# Patient Record
Sex: MALE | Race: ASIAN | NOT HISPANIC OR LATINO | Employment: OTHER | URBAN - METROPOLITAN AREA
[De-identification: names, ages, dates, MRNs, and addresses within clinical notes are randomized per-mention and may not be internally consistent; named-entity substitution may affect disease eponyms.]

---

## 2023-09-06 DIAGNOSIS — E78.2 MIXED HYPERLIPIDEMIA: Primary | ICD-10-CM

## 2023-09-07 RX ORDER — ROSUVASTATIN CALCIUM 10 MG/1
10 TABLET, COATED ORAL DAILY
Qty: 90 TABLET | Refills: 3 | Status: SHIPPED | OUTPATIENT
Start: 2023-09-07

## 2023-10-01 PROBLEM — D33.3 ACOUSTIC NEUROMA (MULTI): Status: ACTIVE | Noted: 2023-10-01

## 2023-10-01 PROBLEM — E78.5 HYPERLIPIDEMIA: Status: ACTIVE | Noted: 2023-10-01

## 2023-10-01 PROBLEM — R55 SYNCOPE: Status: ACTIVE | Noted: 2023-10-01

## 2023-10-01 PROBLEM — R35.1 NOCTURIA: Status: ACTIVE | Noted: 2023-10-01

## 2023-10-01 PROBLEM — M24.40: Status: ACTIVE | Noted: 2023-10-01

## 2023-10-01 PROBLEM — H90.3 ASYMMETRICAL SENSORINEURAL HEARING LOSS: Status: ACTIVE | Noted: 2023-10-01

## 2023-10-01 PROBLEM — R97.20 ELEVATED PSA: Status: ACTIVE | Noted: 2023-10-01

## 2023-10-01 PROBLEM — M19.042 ARTHRITIS OF BOTH HANDS: Status: ACTIVE | Noted: 2023-10-01

## 2023-10-01 PROBLEM — S46.911A RIGHT SHOULDER STRAIN: Status: ACTIVE | Noted: 2023-10-01

## 2023-10-01 PROBLEM — I25.10 PRECLINICAL CORONARY ARTERY DISEASE: Status: ACTIVE | Noted: 2023-10-01

## 2023-10-01 PROBLEM — M24.411 RECURRENT DISLOCATION OF RIGHT SHOULDER: Status: ACTIVE | Noted: 2023-10-01

## 2023-10-01 PROBLEM — M54.10 BILATERAL RADIATING LEG PAIN: Status: ACTIVE | Noted: 2023-10-01

## 2023-10-01 PROBLEM — M19.041 ARTHRITIS OF BOTH HANDS: Status: ACTIVE | Noted: 2023-10-01

## 2023-10-01 PROBLEM — M25.311 INSTABILITY OF RIGHT SHOULDER JOINT: Status: ACTIVE | Noted: 2023-10-01

## 2023-10-01 PROBLEM — R73.03 PREDIABETES: Status: ACTIVE | Noted: 2023-10-01

## 2023-10-03 ENCOUNTER — OFFICE VISIT (OUTPATIENT)
Dept: ORTHOPEDIC SURGERY | Facility: CLINIC | Age: 73
End: 2023-10-03
Payer: MEDICARE

## 2023-10-03 VITALS — BODY MASS INDEX: 25.43 KG/M2 | WEIGHT: 162 LBS | HEIGHT: 67 IN

## 2023-10-03 DIAGNOSIS — S46.019S TRAUMATIC COMPLETE TEAR OF ROTATOR CUFF, UNSPECIFIED LATERALITY, SEQUELA: Primary | ICD-10-CM

## 2023-10-03 PROCEDURE — 1159F MED LIST DOCD IN RCRD: CPT | Performed by: ORTHOPAEDIC SURGERY

## 2023-10-03 PROCEDURE — 99214 OFFICE O/P EST MOD 30 MIN: CPT | Performed by: ORTHOPAEDIC SURGERY

## 2023-10-03 PROCEDURE — 1160F RVW MEDS BY RX/DR IN RCRD: CPT | Performed by: ORTHOPAEDIC SURGERY

## 2023-10-03 NOTE — LETTER
October 3, 2023     Moo Traore MD  [address]  [facility], [address]  [facility] [address]    Patient: Giacomo Garay   YOB: 1950   Date of Visit: 10/3/2023       Dear Dr. Moo Traore MD:    Thank you for referring Giacomo Garay to me for evaluation. Below are my notes for this consultation.  If you have questions, please do not hesitate to call me. I look forward to following your patient along with you.       Sincerely,     Ayad Ron MD      CC: No Recipients  ______________________________________________________________________________________    Follow-up to discuss his MRI findings  He has no pain in the shoulder states his larger concern is a repeat dislocation which happened a couple times the last several years  His MRI from 2019 shows no cuff tear  MRI is reviewed personally and shows a small retracted cuff tear with minimal muscular atrophy  Past medical,family and social histories have been reviewed and are up to date.  All other body systems have been reviewed and are negative for complaint.  Constitutional: Well-developed well-nourished   Eyes: Sclerae anicteric, pupils equal and round  HENT: Normocephalic atraumatic  Cardiovascular: Pulses full, regular rate and rhythm  Respiratory: Breathing not labored, no wheezing  Integumentary: Skin intact, no lesions or rashes  Neurological: Sensation intact, no gross strength deficits, reflexes equal  Psychiatric: Alert oriented and appropriate  Hematologic/lymphatic: No lymphadenopathy  Right shoulder it no swelling or deformity has full mobility has good cuff strength minor pain in the impingement position  Impression cuff tear underlying shoulder instability he states he knows what types of actions precipitated dislocation was only happened a couple of times he is having no pain so is not inclined to proceed with any type of surgical reconstruction we discussed the natural history of rotator cuff  disease tearing and problems with healing as we age  He is content to caters activity accordingly and revisit this if symptoms persist or worsen  All questions answered

## 2023-10-03 NOTE — PROGRESS NOTES
Follow-up to discuss his MRI findings  He has no pain in the shoulder states his larger concern is a repeat dislocation which happened a couple times the last several years  His MRI from 2019 shows no cuff tear  MRI is reviewed personally and shows a small retracted cuff tear with minimal muscular atrophy  Past medical,family and social histories have been reviewed and are up to date.  All other body systems have been reviewed and are negative for complaint.  Constitutional: Well-developed well-nourished   Eyes: Sclerae anicteric, pupils equal and round  HENT: Normocephalic atraumatic  Cardiovascular: Pulses full, regular rate and rhythm  Respiratory: Breathing not labored, no wheezing  Integumentary: Skin intact, no lesions or rashes  Neurological: Sensation intact, no gross strength deficits, reflexes equal  Psychiatric: Alert oriented and appropriate  Hematologic/lymphatic: No lymphadenopathy  Right shoulder it no swelling or deformity has full mobility has good cuff strength minor pain in the impingement position  Impression cuff tear underlying shoulder instability he states he knows what types of actions precipitated dislocation was only happened a couple of times he is having no pain so is not inclined to proceed with any type of surgical reconstruction we discussed the natural history of rotator cuff disease tearing and problems with healing as we age  He is content to caters activity accordingly and revisit this if symptoms persist or worsen  All questions answered

## 2024-06-17 ENCOUNTER — APPOINTMENT (OUTPATIENT)
Dept: PRIMARY CARE | Facility: CLINIC | Age: 74
End: 2024-06-17
Payer: MEDICARE

## 2024-06-17 VITALS
WEIGHT: 159 LBS | HEIGHT: 66 IN | HEART RATE: 75 BPM | DIASTOLIC BLOOD PRESSURE: 92 MMHG | SYSTOLIC BLOOD PRESSURE: 172 MMHG | TEMPERATURE: 98 F | BODY MASS INDEX: 25.55 KG/M2 | OXYGEN SATURATION: 99 %

## 2024-06-17 DIAGNOSIS — H61.22 IMPACTED CERUMEN OF LEFT EAR: ICD-10-CM

## 2024-06-17 DIAGNOSIS — Z12.5 SCREENING FOR PROSTATE CANCER: ICD-10-CM

## 2024-06-17 DIAGNOSIS — Z00.00 ROUTINE GENERAL MEDICAL EXAMINATION AT HEALTH CARE FACILITY: Primary | ICD-10-CM

## 2024-06-17 DIAGNOSIS — E78.2 MIXED HYPERLIPIDEMIA: ICD-10-CM

## 2024-06-17 PROCEDURE — 99214 OFFICE O/P EST MOD 30 MIN: CPT | Performed by: FAMILY MEDICINE

## 2024-06-17 PROCEDURE — 1159F MED LIST DOCD IN RCRD: CPT | Performed by: FAMILY MEDICINE

## 2024-06-17 PROCEDURE — 1170F FXNL STATUS ASSESSED: CPT | Performed by: FAMILY MEDICINE

## 2024-06-17 PROCEDURE — G0439 PPPS, SUBSEQ VISIT: HCPCS | Performed by: FAMILY MEDICINE

## 2024-06-17 PROCEDURE — 1160F RVW MEDS BY RX/DR IN RCRD: CPT | Performed by: FAMILY MEDICINE

## 2024-06-17 PROCEDURE — 1036F TOBACCO NON-USER: CPT | Performed by: FAMILY MEDICINE

## 2024-06-17 RX ORDER — ROSUVASTATIN CALCIUM 10 MG/1
10 TABLET, COATED ORAL DAILY
Qty: 90 TABLET | Refills: 3 | Status: SHIPPED | OUTPATIENT
Start: 2024-06-17

## 2024-06-17 ASSESSMENT — ACTIVITIES OF DAILY LIVING (ADL)
GROCERY_SHOPPING: NEEDS ASSISTANCE
MANAGING_FINANCES: INDEPENDENT
DOING_HOUSEWORK: INDEPENDENT
TAKING_MEDICATION: INDEPENDENT
DRESSING: INDEPENDENT
BATHING: INDEPENDENT

## 2024-06-17 ASSESSMENT — PATIENT HEALTH QUESTIONNAIRE - PHQ9
SUM OF ALL RESPONSES TO PHQ9 QUESTIONS 1 AND 2: 0
1. LITTLE INTEREST OR PLEASURE IN DOING THINGS: NOT AT ALL
2. FEELING DOWN, DEPRESSED OR HOPELESS: NOT AT ALL

## 2024-06-17 NOTE — PROGRESS NOTES
"Subjective   Reason for Visit: Giacomo Garay is an 73 y.o. male here for a Medicare Wellness visit.     Past Medical, Surgical, and Family History reviewed and updated in chart.    Reviewed all medications by prescribing practitioner or clinical pharmacist (such as prescriptions, OTCs, herbal therapies and supplements) and documented in the medical record.    HPI    Patient Care Team:  Evert Steward MD as PCP - General (Family Medicine)  Moo Traore MD as PCP - [employer] PCP     Review of Systems   All other systems reviewed and are negative.    Preparing meals - independent  Using telephone - independent  Bladder - continent  Bowel - continent    Recent hospital stays - none    ADLs - able to dress, walk and bath independently  Instrumental ADL's - able to shop, maintain finances, managing medications, housekeeping independently    Depression: PHQ-2 (screening 2 mins) - negative    Opioid use -   none  Exercise -  stays active  Diet - well balanced  Pain score - none    Providers    No cognitive impairment appreciated    Education - educated pt on healthy eating, exercise, weight loss    Falls - none    Counseled pt on living will    AAA screening:  NA     Prostate CA screen:  checking PSA    CV screening:  CA score was 27.6    Colonoscopy: done in 2019    Diabetes screening:  none    Glaucoma screen:  sees optho    CT chest tob screen: NA    Vaccines:  Talked to pt about seeing pharmacy about his pneumonia shots and        Objective   Vitals:  BP (!) 172/92   Pulse 75   Temp 36.7 °C (98 °F)   Ht 1.676 m (5' 6\")   Wt 72.1 kg (159 lb)   SpO2 99%   BMI 25.66 kg/m²       Physical Exam  Vitals reviewed.   Constitutional:       General: He is not in acute distress.     Appearance: Normal appearance. He is well-developed. He is not diaphoretic.   HENT:      Head: Normocephalic and atraumatic.      Right Ear: Tympanic membrane normal.      Left Ear: Tympanic membrane normal.      Nose: " Nose normal.      Mouth/Throat:      Mouth: Mucous membranes are moist.   Eyes:      Pupils: Pupils are equal, round, and reactive to light.   Cardiovascular:      Rate and Rhythm: Normal rate and regular rhythm.      Heart sounds: Normal heart sounds. No murmur heard.     No friction rub. No gallop.   Pulmonary:      Effort: Pulmonary effort is normal.      Breath sounds: Normal breath sounds. No rales.   Abdominal:      General: Bowel sounds are normal.      Palpations: Abdomen is soft.      Tenderness: There is no abdominal tenderness.   Musculoskeletal:      Cervical back: Normal range of motion and neck supple.   Skin:     General: Skin is warm and dry.   Neurological:      Mental Status: He is alert.   Psychiatric:         Mood and Affect: Mood normal.         Assessment/Plan   Problem List Items Addressed This Visit       Hyperlipidemia    Relevant Medications    rosuvastatin (Crestor) 10 mg tablet    Other Relevant Orders    Lipid Panel    Comprehensive Metabolic Panel    CBC and Auto Differential     Other Visit Diagnoses       Routine general medical examination at health care facility    -  Primary    Screening for prostate cancer        Relevant Orders    Prostate Specific Antigen    Impacted cerumen of left ear        Relevant Orders    Ear cerumen removal          Lavaged L ear.   Refilled pts meds.   Doing well.   Fu in 12 mo.  Curcumin for arthritis.

## 2024-07-11 ENCOUNTER — LAB (OUTPATIENT)
Dept: LAB | Facility: LAB | Age: 74
End: 2024-07-11
Payer: MEDICARE

## 2024-07-11 DIAGNOSIS — E78.2 MIXED HYPERLIPIDEMIA: ICD-10-CM

## 2024-07-11 DIAGNOSIS — Z12.5 SCREENING FOR PROSTATE CANCER: ICD-10-CM

## 2024-07-11 LAB
ALBUMIN SERPL BCP-MCNC: 4.5 G/DL (ref 3.4–5)
ALP SERPL-CCNC: 38 U/L (ref 33–136)
ALT SERPL W P-5'-P-CCNC: 22 U/L (ref 10–52)
ANION GAP SERPL CALC-SCNC: 11 MMOL/L (ref 10–20)
AST SERPL W P-5'-P-CCNC: 26 U/L (ref 9–39)
BASOPHILS # BLD AUTO: 0.03 X10*3/UL (ref 0–0.1)
BASOPHILS NFR BLD AUTO: 0.4 %
BILIRUB SERPL-MCNC: 1 MG/DL (ref 0–1.2)
BUN SERPL-MCNC: 21 MG/DL (ref 6–23)
CALCIUM SERPL-MCNC: 9.5 MG/DL (ref 8.6–10.3)
CHLORIDE SERPL-SCNC: 103 MMOL/L (ref 98–107)
CHOLEST SERPL-MCNC: 202 MG/DL (ref 0–199)
CHOLESTEROL/HDL RATIO: 3.2
CO2 SERPL-SCNC: 29 MMOL/L (ref 21–32)
CREAT SERPL-MCNC: 1.41 MG/DL (ref 0.5–1.3)
EGFRCR SERPLBLD CKD-EPI 2021: 53 ML/MIN/1.73M*2
EOSINOPHIL # BLD AUTO: 0.09 X10*3/UL (ref 0–0.4)
EOSINOPHIL NFR BLD AUTO: 1.1 %
ERYTHROCYTE [DISTWIDTH] IN BLOOD BY AUTOMATED COUNT: 12.6 % (ref 11.5–14.5)
GLUCOSE SERPL-MCNC: 85 MG/DL (ref 74–99)
HCT VFR BLD AUTO: 48.8 % (ref 41–52)
HDLC SERPL-MCNC: 62.2 MG/DL
HGB BLD-MCNC: 15.7 G/DL (ref 13.5–17.5)
IMM GRANULOCYTES # BLD AUTO: 0.02 X10*3/UL (ref 0–0.5)
IMM GRANULOCYTES NFR BLD AUTO: 0.3 % (ref 0–0.9)
LDLC SERPL CALC-MCNC: 121 MG/DL
LYMPHOCYTES # BLD AUTO: 2.19 X10*3/UL (ref 0.8–3)
LYMPHOCYTES NFR BLD AUTO: 27.7 %
MCH RBC QN AUTO: 28.3 PG (ref 26–34)
MCHC RBC AUTO-ENTMCNC: 32.2 G/DL (ref 32–36)
MCV RBC AUTO: 88 FL (ref 80–100)
MONOCYTES # BLD AUTO: 0.71 X10*3/UL (ref 0.05–0.8)
MONOCYTES NFR BLD AUTO: 9 %
NEUTROPHILS # BLD AUTO: 4.86 X10*3/UL (ref 1.6–5.5)
NEUTROPHILS NFR BLD AUTO: 61.5 %
NON HDL CHOLESTEROL: 140 MG/DL (ref 0–149)
NRBC BLD-RTO: 0.3 /100 WBCS (ref 0–0)
PLATELET # BLD AUTO: 235 X10*3/UL (ref 150–450)
POTASSIUM SERPL-SCNC: 4.7 MMOL/L (ref 3.5–5.3)
PROT SERPL-MCNC: 6.9 G/DL (ref 6.4–8.2)
PSA SERPL-MCNC: 9.55 NG/ML
RBC # BLD AUTO: 5.55 X10*6/UL (ref 4.5–5.9)
SODIUM SERPL-SCNC: 138 MMOL/L (ref 136–145)
TRIGL SERPL-MCNC: 92 MG/DL (ref 0–149)
VLDL: 18 MG/DL (ref 0–40)
WBC # BLD AUTO: 7.9 X10*3/UL (ref 4.4–11.3)

## 2024-07-11 PROCEDURE — 36415 COLL VENOUS BLD VENIPUNCTURE: CPT

## 2024-07-11 PROCEDURE — 85025 COMPLETE CBC W/AUTO DIFF WBC: CPT

## 2024-07-11 PROCEDURE — 80053 COMPREHEN METABOLIC PANEL: CPT

## 2024-07-11 PROCEDURE — 80061 LIPID PANEL: CPT

## 2024-07-11 PROCEDURE — G0103 PSA SCREENING: HCPCS

## 2024-07-13 DIAGNOSIS — R97.20 PSA ELEVATION: Primary | ICD-10-CM

## 2024-07-13 NOTE — RESULT ENCOUNTER NOTE
Pts BW looks overall good but his PSA close to doubled in 2 years.  I'm concerned that there could be some early prostate cancer happening.  I referred him to Dr. Herman who I think is the best in this area. Anesthesia Volume In Cc: 3.5

## 2024-07-15 ENCOUNTER — TELEPHONE (OUTPATIENT)
Dept: PRIMARY CARE | Facility: CLINIC | Age: 74
End: 2024-07-15
Payer: MEDICARE

## 2024-08-21 DIAGNOSIS — R97.20 ELEVATED PSA: Primary | ICD-10-CM

## 2024-09-16 ENCOUNTER — LAB (OUTPATIENT)
Dept: LAB | Facility: LAB | Age: 74
End: 2024-09-16
Payer: MEDICARE

## 2024-09-16 ENCOUNTER — HOSPITAL ENCOUNTER (OUTPATIENT)
Dept: RADIOLOGY | Facility: CLINIC | Age: 74
Discharge: HOME | End: 2024-09-16
Payer: MEDICARE

## 2024-09-16 DIAGNOSIS — R97.20 ELEVATED PSA: ICD-10-CM

## 2024-09-16 LAB — PSA SERPL-MCNC: 8.04 NG/ML

## 2024-09-16 PROCEDURE — A9575 INJ GADOTERATE MEGLUMI 0.1ML: HCPCS | Performed by: STUDENT IN AN ORGANIZED HEALTH CARE EDUCATION/TRAINING PROGRAM

## 2024-09-16 PROCEDURE — 84153 ASSAY OF PSA TOTAL: CPT

## 2024-09-16 PROCEDURE — 2550000001 HC RX 255 CONTRASTS: Performed by: STUDENT IN AN ORGANIZED HEALTH CARE EDUCATION/TRAINING PROGRAM

## 2024-09-16 PROCEDURE — 36415 COLL VENOUS BLD VENIPUNCTURE: CPT

## 2024-09-16 PROCEDURE — 76498 UNLISTED MR PROCEDURE: CPT

## 2024-09-16 PROCEDURE — 76498 UNLISTED MR PROCEDURE: CPT | Performed by: RADIOLOGY

## 2024-09-16 PROCEDURE — 72197 MRI PELVIS W/O & W/DYE: CPT | Performed by: RADIOLOGY

## 2024-09-16 RX ORDER — GADOTERATE MEGLUMINE 376.9 MG/ML
0.2 INJECTION INTRAVENOUS
Status: COMPLETED | OUTPATIENT
Start: 2024-09-16 | End: 2024-09-16

## 2024-10-22 PROCEDURE — G0416 PROSTATE BIOPSY, ANY MTHD: HCPCS

## 2024-10-22 PROCEDURE — G0416 PROSTATE BIOPSY, ANY MTHD: HCPCS | Mod: TC,SUR,OUT | Performed by: STUDENT IN AN ORGANIZED HEALTH CARE EDUCATION/TRAINING PROGRAM

## 2024-10-22 PROCEDURE — G0416 PROSTATE BIOPSY, ANY MTHD: HCPCS | Performed by: STUDENT IN AN ORGANIZED HEALTH CARE EDUCATION/TRAINING PROGRAM

## 2024-10-23 ENCOUNTER — LAB REQUISITION (OUTPATIENT)
Dept: LAB | Facility: HOSPITAL | Age: 74
End: 2024-10-23
Payer: MEDICARE

## 2024-10-23 DIAGNOSIS — R97.20 ELEVATED PROSTATE SPECIFIC ANTIGEN (PSA): ICD-10-CM

## 2024-11-04 LAB
LAB AP BLOCK FOR ADDITIONAL STUDIES: NORMAL
LABORATORY COMMENT REPORT: NORMAL
PATH REPORT.FINAL DX SPEC: NORMAL
PATH REPORT.GROSS SPEC: NORMAL
PATH REPORT.RELEVANT HX SPEC: NORMAL
PATH REPORT.TOTAL CANCER: NORMAL

## 2024-11-06 DIAGNOSIS — C61 PROSTATE CANCER (MULTI): Primary | ICD-10-CM

## 2024-11-25 ENCOUNTER — HOSPITAL ENCOUNTER (OUTPATIENT)
Dept: RADIATION ONCOLOGY | Facility: CLINIC | Age: 74
Setting detail: RADIATION/ONCOLOGY SERIES
Discharge: HOME | End: 2024-11-25
Payer: MEDICARE

## 2024-11-25 DIAGNOSIS — C61 PROSTATE CANCER (MULTI): ICD-10-CM

## 2024-11-25 PROCEDURE — 99205 OFFICE O/P NEW HI 60 MIN: CPT | Performed by: RADIOLOGY

## 2024-11-25 PROCEDURE — 99215 OFFICE O/P EST HI 40 MIN: CPT | Mod: 95 | Performed by: RADIOLOGY

## 2024-11-25 SDOH — ECONOMIC STABILITY: TRANSPORTATION INSECURITY
IN THE PAST 12 MONTHS, HAS THE LACK OF TRANSPORTATION KEPT YOU FROM MEDICAL APPOINTMENTS OR FROM GETTING MEDICATIONS?: NO

## 2024-11-25 SDOH — ECONOMIC STABILITY: FOOD INSECURITY: WITHIN THE PAST 12 MONTHS, THE FOOD YOU BOUGHT JUST DIDN'T LAST AND YOU DIDN'T HAVE MONEY TO GET MORE.: NEVER TRUE

## 2024-11-25 SDOH — ECONOMIC STABILITY: INCOME INSECURITY: IN THE LAST 12 MONTHS, WAS THERE A TIME WHEN YOU WERE NOT ABLE TO PAY THE MORTGAGE OR RENT ON TIME?: NO

## 2024-11-25 SDOH — ECONOMIC STABILITY: FOOD INSECURITY: WITHIN THE PAST 12 MONTHS, YOU WORRIED THAT YOUR FOOD WOULD RUN OUT BEFORE YOU GOT MONEY TO BUY MORE.: NEVER TRUE

## 2024-11-25 SDOH — ECONOMIC STABILITY: TRANSPORTATION INSECURITY
IN THE PAST 12 MONTHS, HAS LACK OF TRANSPORTATION KEPT YOU FROM MEETINGS, WORK, OR FROM GETTING THINGS NEEDED FOR DAILY LIVING?: NO

## 2024-11-25 ASSESSMENT — SOCIAL DETERMINANTS OF HEALTH (SDOH)
HOW HARD IS IT FOR YOU TO PAY FOR THE VERY BASICS LIKE FOOD, HOUSING, MEDICAL CARE, AND HEATING?: NOT HARD AT ALL
IN THE PAST 12 MONTHS, HAS THE ELECTRIC, GAS, OIL, OR WATER COMPANY THREATENED TO SHUT OFF SERVICE IN YOUR HOME?: NO
WITHIN THE LAST YEAR, HAVE YOU BEEN AFRAID OF YOUR PARTNER OR EX-PARTNER?: NO
WITHIN THE LAST YEAR, HAVE YOU BEEN HUMILIATED OR EMOTIONALLY ABUSED IN OTHER WAYS BY YOUR PARTNER OR EX-PARTNER?: NO
WITHIN THE LAST YEAR, HAVE YOU BEEN KICKED, HIT, SLAPPED, OR OTHERWISE PHYSICALLY HURT BY YOUR PARTNER OR EX-PARTNER?: NO
WITHIN THE LAST YEAR, HAVE TO BEEN RAPED OR FORCED TO HAVE ANY KIND OF SEXUAL ACTIVITY BY YOUR PARTNER OR EX-PARTNER?: NO

## 2024-11-25 ASSESSMENT — PATIENT HEALTH QUESTIONNAIRE - PHQ9
SUM OF ALL RESPONSES TO PHQ9 QUESTIONS 1 AND 2: 0
2. FEELING DOWN, DEPRESSED OR HOPELESS: NOT AT ALL
1. LITTLE INTEREST OR PLEASURE IN DOING THINGS: NOT AT ALL

## 2024-11-25 ASSESSMENT — ENCOUNTER SYMPTOMS
DEPRESSION: 0
LOSS OF SENSATION IN FEET: 0
OCCASIONAL FEELINGS OF UNSTEADINESS: 0

## 2024-11-25 ASSESSMENT — COLUMBIA-SUICIDE SEVERITY RATING SCALE - C-SSRS
6. HAVE YOU EVER DONE ANYTHING, STARTED TO DO ANYTHING, OR PREPARED TO DO ANYTHING TO END YOUR LIFE?: NO
2. HAVE YOU ACTUALLY HAD ANY THOUGHTS OF KILLING YOURSELF?: NO
1. IN THE PAST MONTH, HAVE YOU WISHED YOU WERE DEAD OR WISHED YOU COULD GO TO SLEEP AND NOT WAKE UP?: NO

## 2024-11-25 NOTE — PROGRESS NOTES
Patient called for virtual consult for prostate cancer. Reports nocturia 2x/night but no other symptoms. Per Dr. Espinoza patient will self monitor condition and return for follow up in 12 months with PSA checks every 6 months. Information on prostate radiation will be sent to patient.

## 2024-11-25 NOTE — PROGRESS NOTES
Radiation Oncology Nursing Note    Prior Radiotherapy:  No  Radiation Treatments       No radiation treatments to show. (Treatments may have been administered in another system.)          Current Systemic Treatment:  No     Presence of Pacemaker or ICD:  No    History of Autoimmune or Connective Tissue Disorders:  No    Pain: The patient's current pain level was assessed.  They report currently having a pain of 0 out of 10.  They feel their pain is under control without the use of pain medications.    Review of Systems:  Review of Systems   All other systems reviewed and are negative.

## 2024-11-25 NOTE — ADDENDUM NOTE
Encounter addended by: Sebas Espinoza MD on: 11/25/2024 11:35 AM   Actions taken: Clinical Note Signed

## 2024-11-25 NOTE — PROGRESS NOTES
"Radiation Oncology Outpatient Consult    Patient Name:  Giacomo Garay  MRN:  [id number]  :  1950    Referring Provider: Kaz Herman MD  Primary Care Provider: Evert Steward MD  Care Team: Patient Care Team:  Evert Steward MD as PCP - General (Family Medicine)  Moo Traore MD as PCP - [employer] PCP    Date of Service: 2024     This visit is done via [employer] w audio and visual capability. Pt is at home and I am in my office.     SUBJECTIVE  History of Present Illness:  Giacomo Garay \"Kwaku\" is a 74 y.o. male who was referred by Kaz Herman MD, for a consultation to the [facility] Department of Radiation Oncology.  He is presenting for evaluation and management of prostate ca.     He has been followed w PSA screening.     PSA 5.3. At the time he was seeing Dr Smyth of urology.    2024 PSA 9.55    2024 PSA 8    2024 MRI prostate impression states: A PI-RADS 5 lesion in the right anterior apical transition zone. Broad-based abutment with the fibromuscular stroma without bulging or irregularity to suggest invasion. No evidence of enlarged pelvic lymph nodes.    Oct 22 2024 Prostate biopsy done by Dr aKz Herman and reveals adenoca, max GG 2, with 4/12 cores involved.  Decipher pending.    2024 the pt reports via Telehealth to [facility]. He is accompanied by his wife. IPSS likely < ~7. ASUNCION likely > 15. Nocturia x 2. No  meds. No rectal symptoms.  He travels between the [facility] and the [facility], where his children live.      Prior Radiotherapy:  No radiation treatments to show. (Treatments may have been administered in another system.)       Past Medical History:    Past Medical History:   Diagnosis Date    Dislocated shoulder     multiple times on the right    Encounter for general adult medical examination without abnormal findings 2021    Medicare annual wellness visit, initial    " Essential (primary) hypertension 06/08/2020    White coat syndrome with hypertension    Impacted cerumen, bilateral 03/28/2019    Excessive cerumen in both ear canals    Other specified health status     No pertinent past medical history    Personal history of colonic polyps 03/18/2019    History of colonic polyps    Personal history of other diseases of the musculoskeletal system and connective tissue 01/13/2020    History of dislocation of shoulder    Personal history of other diseases of the musculoskeletal system and connective tissue 01/08/2020    History of dislocation of shoulder    Prostate cancer (Multi)     Strain of unspecified muscle(s) and tendon(s) at lower leg level, left leg, initial encounter 06/17/2020    Strain of left knee, initial encounter        Past Surgical History:    Past Surgical History:   Procedure Laterality Date    PROSTATE BIOPSY          Family History:  Cancer-related family history includes Cancer in his mother.    Social History:    Social History     Tobacco Use    Smoking status: Never    Smokeless tobacco: Never   Vaping Use    Vaping status: Never Used   Substance Use Topics    Alcohol use: Yes     Alcohol/week: 1.0 standard drink of alcohol     Types: 1 Glasses of wine per week    Drug use: Not Currently       Allergies:  No Known Allergies     Medications:    Current Outpatient Medications:     rosuvastatin (Crestor) 10 mg tablet, Take 1 tablet (10 mg) by mouth once daily., Disp: 90 tablet, Rfl: 3      Review of Systems:  Review of Systems - Oncology    Performance Status:  The Karnofsky performance scale today is 100, Fully active, able to carry on all pre-disease performed without restriction (ECOG equivalent 0).    General: Negative for weight changes, night sweats, fever, recent trauma.   Eyes: Negative for blurriness, eye pain, blind spots.   Ears, nose, mouth, throat: Negative for changes in hearing, changes in taste, sore throat, mouth sores.  Cardiovascular:  Negative for palpitations, chest pain, tightness.   Pulmonary: Negative for shortness of breath, cough, sputum.   Gastrointestinal: Negative for indigestion, diarrhea, constipation, abdominal pain, blood in stool, nausea/vomiting.   Genitourinary: Per HPI.   Neuro: Negative for numbness, tingling, weakness, changes in speech.   Musculoskeletal: Negative for muscle pain, joint pain, edema, tenderness.   Endocrine: Negative for hot flashes.   Other: All others negative.      OBJECTIVE  There were no vitals taken for this visit.   Physical Exam   General: The patient appears well and is in no acute distress.   Cognition: Acceptable understanding of the essential elements of the medical situation.   Speech: Fluent and coherent.   Eyes: No conjunctival infection. Anicteric. Extraocular movements intact.   Head, ears, nose, mouth, throat: Atraumatic. Moist mucous membranes. Trachea midline.   Lymphatic: No visible cervical lymphadenopathy.   Cardiovascular: No visible jugular venous distention. Skin perfused.   Pulmonary: Breathes comfortably on room air without stridor or cough.   Extremities: No edema or muscle wasting.   Neurologic: Cranial nerves II-XII are grossly intact. No gross focal deficits.   Psychiatric: Mood is appropriate.       Laboratory Review:  There are no laboratory contraindications to radiation therapy.    The pertinent lab results were reviewed and discussed with the patient.  Notably,   Lab Results   Component Value Date    PSA 8.04 (H) 09/16/2024    PSA 9.55 (H) 07/11/2024    PSA 5.27 (H) 11/03/2021       Pathology Review:  The pertinent pathology results were reviewed and discussed with the patient.  Notably,   A. PROSTATE, NEEDLE BIOPSY, RIGHT ANTERIOR LATERAL:   -- Prostatic adenocarcinoma, [place] score 3+3=6, Grade Group 1, involving 2 of 2 fragments and cores and approximately 80% of the overall specimen.      B. PROSTATE, NEEDLE BIOPSY, RIGHT ANTERIOR MEDIAL:   -- Prostatic adenocarcinoma,  [place] score 3+4=7, Grade Group 2, involving 1 of 1 core and approximately 70% of the overall specimen. See note.     Note: [place] pattern 4 comprises less than 5% of the total tumor volume and is composed of poorly formed glands. Cribriform growth pattern is absent.      C. PROSTATE, NEEDLE BIOPSY, RIGHT POSTERIOR LATERAL:   -- Prostatic tissue with no evidence of malignancy.      D. PROSTATE, NEEDLE BIOPSY, RIGHT POSTERIOR MEDIAL:   -- Prostatic tissue with no evidence of malignancy.      E. PROSTATE, BIOPSY, RIGHT BASE:   -- Prostatic tissue with no evidence of malignancy.      F. PROSTATE, NEEDLE BIOPSY, LEFT ANTERIOR LATERAL:   -- Rare atypical glands present.     G. PROSTATE, NEEDLE BIOPSY, LEFT ANTERIOR MEDIAL:   -- Minute focus of prostatic adenocarcinoma, [place] score 3+3=6, Grade Group 1, involving 1 of 1 core and approximately 5% of the overall specimen.      H. PROSTATE, NEEDLE BIOPSY, LEFT POSTERIOR LATERAL::   -- Prostatic tissue with no evidence of malignancy.      I. PROSTATE, NEEDLE BIOPSY, LEFT POSTERIOR MEDIAL:   -- Prostatic tissue with no evidence of malignancy.      J. PROSTATE, BIOPSY, LEFT BASE:   -- Prostatic adenocarcinoma, [place] score 3+3=6, Grade Group 1, involving 2 of 4 fragments and cores and approximately 10% of the overall specimen.      K. PROSTATE, BIOPSY, TARGETED DAWOOD #1X4:   -- Prostatic adenocarcinoma, [place] score 3+4=7, Grade Group 2, involving 4 of 4 cores and approximately 55% of the overall specimen. See note.        Imaging:  The pertinent imaging results were reviewed and discussed with the patient.  Notably,   MRI per above       ASSESSMENT:   Giacomo Garay is a 74 y.o. male with prostate adenoca. NCCN FIR. Stage II cT1c N0 M0 GG2 4/12 cores involved. 1 DAWOOD biopsy showing GG2 disease. PSA 9. Decipher pending. Imaging with MRI.     PLAN:     We discussed the likely natural history of progression of his prostate cancer.     Treatment options   We discussed the  "treatment options available for prostate cancer based on the NCCN guidelines,     https://www.nccn.org/professionals/physician_gls/pdf/prostate.pdf, version 2024, FIR.    Based on his young age, good health, and the characteristics of his cancer, active surveillance is reasonable for his prostate cancer. We reviewed the risk of progression based on the ProtecT. I shared my screen with him and showed him the figures from this trial.    He has discussed surgical options with a urologist and he is a candidate for a prostatectomy.  He is tentatively not interested in this because of the impact on erections and urinary function.    Radiation options include external beam radiation therapy (EBRT) with intensity modulated radiation therapy (IMRT) alone; or brachytherapy (BT), with either low dose rate (LDR-BT) seed implant with iodine 125, or high dose rate brachytherapy (HDR-BT) with iridium 192. We discussed the rationale, indications, contraindications, logistics, and expected toxicities for these therapies.     External beam radiation therapy (EBRT)   With respect to EBRT, I explained the difference between 3D conformal RT and IMRT alone and the fact that there is better radiation dose homogeneity and fewer bladder and rectal complications with IMRT compared to 3D conformal RT.     Historically, EBRT has been delivered and 1.8-2 Gy fractions over a course of about 8-9 weeks. Hypofractionated techniques with EBRT, including \"moderate\" hypofractionation and \"extreme\" hypofractionation, i.e. stereotactic body radiation therapy (SBRT), are treatment options in select patients. He is a candidate for moderate hypofractionation and SBRT.    We discussed the benefits and risks of a course of radiation therapy, which include, but are not limited to, increased daytime urinary frequency, urgency, nocturia, dysuria, hematuria, loosening of the stools, increase in the number of bowel movements, rectal bleeding, rectal discomfort " "and fatigue. The potential late complications were also explained which include, but are not limited to, chronic cystitis, chronic proctitis, a 15% risk of rectal bleeding, a 2-3% risk of severe rectal bleeding, a less than 1% risk of bowel or bladder injury requiring surgical intervention, a less than 1% risk of urinary incontinence, and a 50-70% potency preservation rate. Additional possible complications with a radiation implant include bleeding, infection, headaches from anesthesia, and long term indwelling Wright catheter.     We discussed the use of spacer to minimize risk of rectal toxicity, and I offered him referral to for placement, if he proceeds with EBRT, especially with SBRT.    We discussed the use of photon vs proton radiation therapy. I estimate his chance of cancer control is relatively similar between photons vs protons. I did not recommend proton radiation therapy outside of a clinical trial.    Image guidance for EBRT   I discussed the benefits and risks of various options available for image guided radiation therapy (IGRT), including 2D/3D imaging with conebeam CT (CBCT), implanted gold seed fiducials, and 4D localization with electromagnetic transponders (i.e. [place]). If he were to proceed with RT, we would most likely use CBCT for IGRT.     Brachytherapy  Brachytherapy is a treatment option in select patients with prostate cancer. We discussed the use of brachytherapy monotherapy and combination therapies of brachytherapy plus EBRT (i.e. \"brachytherapy boost\") in detail. I did not recommend brachytherapy since SBRT is equally as convening, has no anesthesia risk, less  dependence, likely less QOL impact.    Other local therapy options   I did not recommend cryosurgery due to its lack of efficacy and increased toxicity. I did not recommend HIFU due to its investigational status in the [place] and lack of long-term data.     Hormone therapy / androgen deprivation therapy (ADT) "   We discussed the benefits of ADT for his prostate cancer.     We discussed the side effects of a course of ADT, which include, but are not limited to, fatigue, hot flashes, weight gain, impotence, decreased libido, anemia, osteopenia, depression, possible increased risk of liver damage, osteoporosis, muscle weakness, gynecomastia and cardiac problems.     Given his risk group, I did not recommended ADT.     Treatment for this patient   After discussion of all options, the therapy that would be most efficacious and maintain quality of life for this patient is likely active surveillance, since he wants to minimize impact on his QOL, especially if there is no impact on his overall survival. He will think more about options and get back to us PRN. Tentatively, we will be checking PSA q6m. He will call us in 6 mos if the upcoming PSA rises or he wants treatment; otherwise, we will plan to see him in 1 year for follow up.    The length of the visit was 80 minutes. We spent more than 50% of this time discussing treatment options with the patient. All of the patient's questions were answered. I gave the patient my contact information.     Sebas Espinoza MD MS   Department of Radiation Oncology

## 2024-12-20 LAB
AP SUMMARY REPORT: NORMAL
SCAN RESULT: NORMAL

## 2025-01-13 ENCOUNTER — HOSPITAL ENCOUNTER (OUTPATIENT)
Dept: RADIATION ONCOLOGY | Facility: CLINIC | Age: 75
Setting detail: RADIATION/ONCOLOGY SERIES
Discharge: HOME | End: 2025-01-13
Payer: MEDICARE

## 2025-01-13 DIAGNOSIS — R97.20 ELEVATED PSA: ICD-10-CM

## 2025-01-13 DIAGNOSIS — C61 PROSTATE CANCER (MULTI): ICD-10-CM

## 2025-01-13 PROCEDURE — 99213 OFFICE O/P EST LOW 20 MIN: CPT | Performed by: RADIOLOGY

## 2025-01-13 PROCEDURE — 99213 OFFICE O/P EST LOW 20 MIN: CPT | Mod: 95 | Performed by: RADIOLOGY

## 2025-01-13 ASSESSMENT — PATIENT HEALTH QUESTIONNAIRE - PHQ9
1. LITTLE INTEREST OR PLEASURE IN DOING THINGS: NOT AT ALL
SUM OF ALL RESPONSES TO PHQ9 QUESTIONS 1 AND 2: 0
2. FEELING DOWN, DEPRESSED OR HOPELESS: NOT AT ALL

## 2025-01-13 ASSESSMENT — COLUMBIA-SUICIDE SEVERITY RATING SCALE - C-SSRS
6. HAVE YOU EVER DONE ANYTHING, STARTED TO DO ANYTHING, OR PREPARED TO DO ANYTHING TO END YOUR LIFE?: NO
1. IN THE PAST MONTH, HAVE YOU WISHED YOU WERE DEAD OR WISHED YOU COULD GO TO SLEEP AND NOT WAKE UP?: NO
2. HAVE YOU ACTUALLY HAD ANY THOUGHTS OF KILLING YOURSELF?: NO

## 2025-01-13 ASSESSMENT — PAIN SCALES - GENERAL: PAINLEVEL_OUTOF10: 0-NO PAIN

## 2025-01-13 NOTE — PROGRESS NOTES
Radiation Oncology Nursing Note    Pain: The patient's current pain level was assessed.  They report currently having a pain of 0 out of 10.  They feel their pain is under control without the use of pain medications.    Review of Systems:  Review of Systems - Oncology     Patient will follow up with virtual appointment with Dr. Espnioza in 2 months.

## 2025-01-13 NOTE — PROGRESS NOTES
"Radiation Oncology Follow-Up    Patient Name:  Giacomo Garay  MRN:  [id number]  :  1950    Referring Provider: Sebas Espinoza MD  Primary Care Provider: Evert Steward MD  Care Team: Patient Care Team:  Evert Steward MD as PCP - General (Family Medicine)  Moo rTaore MD as PCP - [employer] Advantage PCP  Kaz Herman MD as Consulting Physician (Urology)    Date of Service: 2025    This visit is via telehealth, via Epic. He is at home and I am in my work office.     SUBJECTIVE  History of Present Illness:  Giacomo Garay \"Kwaku\" is a 74 y.o. male who was previously seen at the [facility] Department of Radiation Oncology for prostate ca.    Since we last me, Kwaku's Decipher came back at 0.64, on the higher side, suggesting a ~3% risk of DM at 10 years.  IPSS 12. QOL 3. ASUNCION 18.  His wife joins us during the visit.        Review of Systems:   Review of Systems - Oncology  General: Negative for weight changes, night sweats, fever, recent trauma.   Eyes: Negative for blurriness, eye pain, blind spots.   Ears, nose, mouth, throat: Negative for changes in hearing, changes in taste, sore throat, mouth sores.  Cardiovascular: Negative for palpitations, chest pain, tightness.   Pulmonary: Negative for shortness of breath, cough, sputum.   Gastrointestinal: Negative for indigestion, diarrhea, constipation, abdominal pain, blood in stool, nausea/vomiting.   Genitourinary: Per HPI.   Neuro: Negative for numbness, tingling, weakness, changes in speech.   Musculoskeletal: Negative for muscle pain, joint pain, edema, tenderness.   Endocrine: Negative for hot flashes.   Other: All others negative.      Performance Status:   The Karnofsky performance scale today is 100, Fully active, able to carry on all pre-disease performed without restriction (ECOG equivalent 0).       OBJECTIVE  Vital Signs:  There were no vitals taken for this visit.  Physical Exam   General: " The patient appears well and is in no acute distress.   Cognition: Acceptable understanding of the essential elements of the medical situation.   Speech: Fluent and coherent.   Eyes: No conjunctival infection. Anicteric. Extraocular movements intact.   Head, ears, nose, mouth, throat: Atraumatic. Moist mucous membranes. Trachea midline.     PATHOLOGY      ASSESSMENT:   Giacomo Garay is a 74 y.o. male with prostate ca. NCCN FIR. Stage II cT1c N0 M0 GG2, 4/12 cores PSA 9.55.  Decipher 0.64.    PLAN:    We reviewed NCCN guidelines and treatment options. At this point, he can consider active surveillance vs treatment with RT. Based on the Decipher, he will likely have disease progression in the next ~5 years and would likely elect treatment. However, this risk of DM is still very low at ~3% in the next 10 years.    He is tentatively planning on repeat PSA in Feb. We will see him in March.    He thinks he will likely get treatment over the summer, since it is easier for him to commute without the [place] snow, and he can coordinate  duties with his daughter, who is in [place].    Sebas Espinoza MD MS  Department of Radiation Oncology

## 2025-02-05 ENCOUNTER — TELEPHONE (OUTPATIENT)
Dept: RADIATION ONCOLOGY | Facility: HOSPITAL | Age: 75
End: 2025-02-05
Payer: MEDICARE

## 2025-02-05 NOTE — TELEPHONE ENCOUNTER
Pt called and scheduled SpaceOAR with Dr. Smyth for 6/3.  He wants to know if he can do [place] at [facility] [place] has to be [facility]?  Please place the SIM order.  He wants radiation at [facility].  He also wants to know if he needs ADT.  Please advise.

## 2025-02-06 DIAGNOSIS — C61 PROSTATE CANCER (MULTI): ICD-10-CM

## 2025-02-06 RX ORDER — CEFAZOLIN SODIUM 2 G/100ML
2 INJECTION, SOLUTION INTRAVENOUS ONCE
OUTPATIENT
Start: 2025-02-06 | End: 2025-02-06

## 2025-02-07 NOTE — TELEPHONE ENCOUNTER
He states that he wants treatment and SIM at [facility] not [facility].  Ok to schedule SIM at [facility]?  He is getting spaceOAR on 6/3.  Is it okay to SIM week of 6/15 per pt request?  Please advise.

## 2025-02-18 PROCEDURE — 84153 ASSAY OF PSA TOTAL: CPT

## 2025-02-18 PROCEDURE — 84154 ASSAY OF PSA FREE: CPT

## 2025-02-19 ENCOUNTER — LAB (OUTPATIENT)
Dept: LAB | Facility: HOSPITAL | Age: 75
End: 2025-02-19
Payer: MEDICARE

## 2025-02-19 DIAGNOSIS — C61 MALIGNANT NEOPLASM OF PROSTATE (MULTI): Primary | ICD-10-CM

## 2025-02-19 DIAGNOSIS — C61 PROSTATE CANCER (MULTI): ICD-10-CM

## 2025-02-20 LAB
PSA FREE MFR SERPL: 14 %
PSA FREE SERPL-MCNC: 1.4 NG/ML
PSA SERPL IA-MCNC: 9.7 NG/ML (ref 0–4)

## 2025-03-10 ENCOUNTER — TELEPHONE (OUTPATIENT)
Dept: RADIATION ONCOLOGY | Facility: CLINIC | Age: 75
End: 2025-03-10
Payer: MEDICARE

## 2025-03-10 ENCOUNTER — HOSPITAL ENCOUNTER (OUTPATIENT)
Dept: RADIATION ONCOLOGY | Facility: CLINIC | Age: 75
Setting detail: RADIATION/ONCOLOGY SERIES
Discharge: HOME | End: 2025-03-10
Payer: MEDICARE

## 2025-03-10 DIAGNOSIS — C61 PROSTATE CANCER (MULTI): ICD-10-CM

## 2025-03-10 PROCEDURE — 99213 OFFICE O/P EST LOW 20 MIN: CPT | Performed by: RADIOLOGY

## 2025-03-10 PROCEDURE — 99213 OFFICE O/P EST LOW 20 MIN: CPT | Mod: 95 | Performed by: RADIOLOGY

## 2025-03-10 NOTE — PROGRESS NOTES
"Radiation Oncology Follow-Up    Patient Name:  Giacomo Garay  MRN:  [id number]  :  1950    Referring Provider: Sebas Espinoza MD  Primary Care Provider: Evert Steward MD  Care Team: Patient Care Team:  Evert Steward MD as PCP - General (Family Medicine)  Moo Traore MD as PCP - [facility]vantage PCP  Kaz Herman MD as Consulting Physician (Urology)    Date of Service: 3/10/2025    SUBJECTIVE  History of Present Illness:  Giacomo Garay \"Kwaku\" is a 74 y.o. male who was previously seen at the [facility] Department of Radiation Oncology for prostate ca.    He reports for routine follow up via telehealth. He feels well and has no issues. No  or GI complaints. ASUNCION 19.  IPSS 9.    Treatment Rendered:   No radiation treatments to show. (Treatments may have been administered in another system.)       Review of Systems:   Review of Systems - Oncology    Performance Status:   The Karnofsky performance scale today is 100, Fully active, able to carry on all pre-disease performed without restriction (ECOG equivalent 0).   General: Negative for weight changes, night sweats, fever, recent trauma.   Eyes: Negative for blurriness, eye pain, blind spots.   Ears, nose, mouth, throat: Negative for changes in hearing, changes in taste, sore throat, mouth sores.  Cardiovascular: Negative for palpitations, chest pain, tightness.   Pulmonary: Negative for shortness of breath, cough, sputum.   Gastrointestinal: Negative for indigestion, diarrhea, constipation, abdominal pain, blood in stool, nausea/vomiting.   Genitourinary: Per HPI.   Neuro: Negative for numbness, tingling, weakness, changes in speech.   Musculoskeletal: Negative for muscle pain, joint pain, edema, tenderness.   Endocrine: Negative for hot flashes.   Other: All others negative.      OBJECTIVE  Vital Signs:  There were no vitals taken for this visit.  Physical Exam   Appears unchanged via video, vs " prior visits.     Lab Results   Component Value Date    PSA 9.7 (H) 02/18/2025    PSA 8.04 (H) 09/16/2024    PSA 9.55 (H) 07/11/2024       Decipher 0.64    ASSESSMENT:   Giacomo Garay is a 74 y.o. male with prostate ca, NCCN FIR. Stage II cT1c N0 M0 GG2, 4/12 cores PSA 9.55. Decipher 0.64.     PLAN:    We reviewed his history and plans for treatment.  We are planning on spacer on Fouzia 3 w Dr Smyth, then CT sim June 9 here, then start SBRT ~2 weeks after this at [facility]. He should complete by mid July.  Sebas Espinoza MD MS  Department of Radiation Oncology

## 2025-03-10 NOTE — PROGRESS NOTES
Radiation Oncology Nursing Note    Pain: The patient's current pain level was assessed.  They report currently having a pain of 0 out of 10.  They feel their pain is under control without the use of pain medications.    Review of Systems:  Review of Systems - Oncology    Pre-charting completed for virtual visit.

## 2025-04-29 DIAGNOSIS — E78.2 MIXED HYPERLIPIDEMIA: ICD-10-CM

## 2025-04-29 RX ORDER — ROSUVASTATIN CALCIUM 10 MG/1
10 TABLET, COATED ORAL DAILY
Qty: 90 TABLET | Refills: 0 | Status: SHIPPED | OUTPATIENT
Start: 2025-04-29

## 2025-05-14 ENCOUNTER — CLINICAL SUPPORT (OUTPATIENT)
Dept: PREADMISSION TESTING | Facility: HOSPITAL | Age: 75
End: 2025-05-14
Payer: MEDICARE

## 2025-05-14 RX ORDER — GLUCOSAMINE/CHONDRO SU A 500-400 MG
1 TABLET ORAL
COMMUNITY

## 2025-05-14 RX ORDER — BISMUTH SUBSALICYLATE 262 MG
1 TABLET,CHEWABLE ORAL DAILY
COMMUNITY

## 2025-05-20 ENCOUNTER — APPOINTMENT (OUTPATIENT)
Dept: PREADMISSION TESTING | Facility: HOSPITAL | Age: 75
End: 2025-05-20
Payer: MEDICARE

## 2025-05-22 ENCOUNTER — LAB (OUTPATIENT)
Dept: LAB | Facility: HOSPITAL | Age: 75
End: 2025-05-22
Payer: MEDICARE

## 2025-05-22 ENCOUNTER — PRE-ADMISSION TESTING (OUTPATIENT)
Dept: PREADMISSION TESTING | Facility: HOSPITAL | Age: 75
End: 2025-05-22
Payer: MEDICARE

## 2025-05-22 VITALS
OXYGEN SATURATION: 98 % | RESPIRATION RATE: 16 BRPM | WEIGHT: 159.1 LBS | TEMPERATURE: 98.1 F | HEART RATE: 87 BPM | HEIGHT: 67 IN | SYSTOLIC BLOOD PRESSURE: 199 MMHG | DIASTOLIC BLOOD PRESSURE: 102 MMHG | BODY MASS INDEX: 24.97 KG/M2

## 2025-05-22 DIAGNOSIS — I10 PRIMARY HYPERTENSION: ICD-10-CM

## 2025-05-22 DIAGNOSIS — Z01.818 ENCOUNTER FOR OTHER PREPROCEDURAL EXAMINATION: Primary | ICD-10-CM

## 2025-05-22 DIAGNOSIS — I10 PRIMARY HYPERTENSION: Primary | ICD-10-CM

## 2025-05-22 DIAGNOSIS — Z01.818 PREOP TESTING: Primary | ICD-10-CM

## 2025-05-22 DIAGNOSIS — R94.31 ABNORMAL EKG: ICD-10-CM

## 2025-05-22 DIAGNOSIS — C61 PROSTATE CANCER (MULTI): ICD-10-CM

## 2025-05-22 LAB
ANION GAP SERPL CALC-SCNC: 18 MMOL/L (ref 10–20)
BASOPHILS # BLD AUTO: 0.03 X10*3/UL (ref 0–0.1)
BASOPHILS NFR BLD AUTO: 0.3 %
BUN SERPL-MCNC: 13 MG/DL (ref 6–23)
CALCIUM SERPL-MCNC: 10.2 MG/DL (ref 8.6–10.3)
CHLORIDE SERPL-SCNC: 98 MMOL/L (ref 98–107)
CO2 SERPL-SCNC: 27 MMOL/L (ref 21–32)
CREAT SERPL-MCNC: 1.22 MG/DL (ref 0.5–1.3)
EGFRCR SERPLBLD CKD-EPI 2021: 63 ML/MIN/1.73M*2
EOSINOPHIL # BLD AUTO: 0.05 X10*3/UL (ref 0–0.4)
EOSINOPHIL NFR BLD AUTO: 0.5 %
ERYTHROCYTE [DISTWIDTH] IN BLOOD BY AUTOMATED COUNT: 12.8 % (ref 11.5–14.5)
GLUCOSE SERPL-MCNC: 140 MG/DL (ref 74–99)
HCT VFR BLD AUTO: 50.2 % (ref 41–52)
HGB BLD-MCNC: 15.9 G/DL (ref 13.5–17.5)
IMM GRANULOCYTES # BLD AUTO: 0.01 X10*3/UL (ref 0–0.5)
IMM GRANULOCYTES NFR BLD AUTO: 0.1 % (ref 0–0.9)
LYMPHOCYTES # BLD AUTO: 2.73 X10*3/UL (ref 0.8–3)
LYMPHOCYTES NFR BLD AUTO: 27.5 %
MCH RBC QN AUTO: 26.8 PG (ref 26–34)
MCHC RBC AUTO-ENTMCNC: 31.7 G/DL (ref 32–36)
MCV RBC AUTO: 85 FL (ref 80–100)
MONOCYTES # BLD AUTO: 0.86 X10*3/UL (ref 0.05–0.8)
MONOCYTES NFR BLD AUTO: 8.7 %
NEUTROPHILS # BLD AUTO: 6.25 X10*3/UL (ref 1.6–5.5)
NEUTROPHILS NFR BLD AUTO: 62.9 %
NRBC BLD-RTO: ABNORMAL /100{WBCS}
PLATELET # BLD AUTO: 259 X10*3/UL (ref 150–450)
POTASSIUM SERPL-SCNC: 5 MMOL/L (ref 3.5–5.3)
RBC # BLD AUTO: 5.93 X10*6/UL (ref 4.5–5.9)
SODIUM SERPL-SCNC: 138 MMOL/L (ref 136–145)
WBC # BLD AUTO: 9.9 X10*3/UL (ref 4.4–11.3)

## 2025-05-22 PROCEDURE — 36415 COLL VENOUS BLD VENIPUNCTURE: CPT

## 2025-05-22 PROCEDURE — 93010 ELECTROCARDIOGRAM REPORT: CPT | Performed by: INTERNAL MEDICINE

## 2025-05-22 PROCEDURE — 85025 COMPLETE CBC W/AUTO DIFF WBC: CPT

## 2025-05-22 PROCEDURE — 93005 ELECTROCARDIOGRAM TRACING: CPT

## 2025-05-22 PROCEDURE — 99205 OFFICE O/P NEW HI 60 MIN: CPT | Performed by: NURSE PRACTITIONER

## 2025-05-22 PROCEDURE — 80048 BASIC METABOLIC PNL TOTAL CA: CPT

## 2025-05-22 RX ORDER — VALSARTAN 160 MG/1
160 TABLET ORAL DAILY
Qty: 90 TABLET | Refills: 1 | Status: SHIPPED | OUTPATIENT
Start: 2025-05-22 | End: 2025-05-22 | Stop reason: SDUPTHER

## 2025-05-22 RX ORDER — VALSARTAN 160 MG/1
160 TABLET ORAL DAILY
Qty: 90 TABLET | Refills: 1 | Status: SHIPPED | OUTPATIENT
Start: 2025-05-22 | End: 2025-11-18

## 2025-05-22 ASSESSMENT — DUKE ACTIVITY SCORE INDEX (DASI)
CAN YOU DO HEAVY WORK AROUND THE HOUSE LIKE SCRUBBING FLOORS OR LIFTING AND MOVING HEAVY FURNITURE: YES
TOTAL_SCORE: 58.2
CAN YOU DO MODERATE WORK AROUND THE HOUSE LIKE VACUUMING, SWEEPING FLOORS OR CARRYING GROCERIES: YES
CAN YOU WALK A BLOCK OR TWO ON LEVEL GROUND: YES
CAN YOU DO LIGHT WORK AROUND THE HOUSE LIKE DUSTING OR WASHING DISHES: YES
CAN YOU WALK INDOORS, SUCH AS AROUND YOUR HOUSE: YES
CAN YOU PARTICIPATE IN STRENOUS SPORTS LIKE SWIMMING, SINGLES TENNIS, FOOTBALL, BASKETBALL, OR SKIING: YES
DASI METS SCORE: 9.9
CAN YOU CLIMB A FLIGHT OF STAIRS OR WALK UP A HILL: YES
CAN YOU DO YARD WORK LIKE RAKING LEAVES, WEEDING OR PUSHING A MOWER: YES
CAN YOU RUN A SHORT DISTANCE: YES
CAN YOU TAKE CARE OF YOURSELF (EAT, DRESS, BATHE, OR USE TOILET): YES
CAN YOU HAVE SEXUAL RELATIONS: YES
CAN YOU PARTICIPATE IN MODERATE RECREATIONAL ACTIVITIES LIKE GOLF, BOWLING, DANCING, DOUBLES TENNIS OR THROWING A BASEBALL OR FOOTBALL: YES

## 2025-05-22 ASSESSMENT — ENCOUNTER SYMPTOMS
CONSTITUTIONAL NEGATIVE: 1
NECK NEGATIVE: 1
RESPIRATORY NEGATIVE: 1
ENDOCRINE NEGATIVE: 1
EYES NEGATIVE: 1
CARDIOVASCULAR NEGATIVE: 1
GASTROINTESTINAL NEGATIVE: 1
MUSCULOSKELETAL NEGATIVE: 1

## 2025-05-22 ASSESSMENT — PAIN - FUNCTIONAL ASSESSMENT: PAIN_FUNCTIONAL_ASSESSMENT: 0-10

## 2025-05-22 ASSESSMENT — LIFESTYLE VARIABLES: SMOKING_STATUS: NONSMOKER

## 2025-05-22 NOTE — PROGRESS NOTES
"Subjective   Patient ID: Giacomo Garay \"Kwaku\" is a 74 y.o. male who presents for No chief complaint on file..  HPI    Problem List[1]    Social Connections: Not on file       Medications Ordered Prior to Encounter[2]     There were no vitals filed for this visit.  There were no vitals filed for this visit.    Review of Systems    Objective     Physical Exam    Pre-Admission Testing on 05/22/2025   Component Date Value Ref Range Status    Ventricular Rate 05/22/2025 50  BPM Preliminary    Atrial Rate 05/22/2025 71  BPM Preliminary    QRS Duration 05/22/2025 92  ms Preliminary    QT Interval 05/22/2025 510  ms Preliminary    QTC Calculation(Bazett) 05/22/2025 464  ms Preliminary    P Axis 05/22/2025 67  degrees Preliminary    R Axis 05/22/2025 -3  degrees Preliminary    T Conyers 05/22/2025 3  degrees Preliminary    QRS Count 05/22/2025 9  beats Preliminary    Q Onset 05/22/2025 219  ms Preliminary    T Offset 05/22/2025 474  ms Preliminary    QTC Fredericia 05/22/2025 480  ms Preliminary       Assessment/Plan          [1]   Patient Active Problem List  Diagnosis    Acoustic neuroma (Multi)    Arthritis of both hands    Asymmetrical sensorineural hearing loss    Bilateral radiating leg pain    Elevated PSA    Hyperlipidemia    Instability of right shoulder joint    Nocturia    Preclinical coronary artery disease    Prediabetes    Recurrent dislocation    Recurrent dislocation of right shoulder    Right shoulder strain    Syncope    BMI 25.0-25.9,adult    Prostate cancer (Multi)   [2]   Current Outpatient Medications on File Prior to Visit   Medication Sig Dispense Refill    glucosamine-chondroitin 500-400 mg tablet Take 1 tablet by mouth.      multivitamin tablet Take 1 tablet by mouth once daily.      rosuvastatin (Crestor) 10 mg tablet TAKE 1 TABLET ONE TIME DAILY (Patient taking differently: Take 1 tablet (10 mg) by mouth once daily in the morning.) 90 tablet 0    TURMERIC ORAL Take by mouth.       No current " facility-administered medications on file prior to visit.

## 2025-05-22 NOTE — CPM/PAT H&P
"CPM/PAT Evaluation       Name: Giacomo Garay (Giacomo Garay \"Kwaku\")  /Age: 1950/74 y.o.     Visit Type:   In-Person       Chief Complaint: Prostate Cancer    HPI 75 yo male referred to PAT by Dr Smyth for evaluation in advance of his fiducial prostate marker insertion on 6/3/2025. Per Dr Espinoza:  NCCN FIR. Stage II cT1c N0 M0 GG2,  cores PSA 9.55. Decipher 0.64.   He will have CT sim  here, then start SBRT ~2 weeks after this at [facility]. He should complete by mid July.       Lab Results   Component Value Date    PSA 9.7 (H) 2025    PSA 8.04 (H) 2024    PSA 9.55 (H) 2024     See PMH below  Medical History[1]    Surgical History[2]    Patient Sexual activity questions deferred to the physician.    Family History[3]    Allergies[4]    Medication Documentation Review Audit       Reviewed by Scar Pritchard LPN (Licensed Practical Nurse) on 25 at 0814      Medication Order Taking? Sig Documenting Provider Last Dose Status   glucosamine-chondroitin 500-400 mg tablet [id number] Yes Take 1 tablet by mouth. Historical Provider, MD 2025 Morning Active   multivitamin tablet [id number] Yes Take 1 tablet by mouth once daily. Historical Provider, MD 2025 Morning Active   rosuvastatin (Crestor) 10 mg tablet [id number] Yes TAKE 1 TABLET ONE TIME DAILY   Patient taking differently: Take 1 tablet (10 mg) by mouth once daily in the morning.    Evert Steward MD 2025 Morning Active   TURMERIC ORAL [id number] Yes Take by mouth. Historical Provider, MD 2025 Morning Active                         PAT ROS:   Constitutional:   neg    Neuro/Psych:   Eyes:   neg    Ears:   neg    Nose:   neg    Mouth:   neg    Throat:   neg    Neck:   neg    Cardio:   neg    Respiratory:   neg    Endocrine:   neg    GI:   neg    :   neg    Musculoskeletal:   neg    Hematologic:   neg    Skin:  neg        Physical Exam  Vitals and nursing note reviewed. Physical exam within normal limits.      " "    PAT AIRWAY:   Airway:     Mallampati::  II    TM distance::  >3 FB    Neck ROM::  Full      Visit Vitals  BP (!) 199/102   Pulse 87   Temp 36.7 °C (98.1 °F)   Resp 16   Ht 1.702 m (5' 7\")   Wt 72.2 kg (159 lb 1.6 oz)   SpO2 98%   BMI 24.92 kg/m²   Smoking Status Never   BSA 1.85 m²       DASI Risk Score      Flowsheet Row Pre-Admission Testing from 5/22/2025 in [facility]   Can you take care of yourself (eat, dress, bathe, or use toilet)?  2.75 filed at 05/22/2025 0941   Can you walk indoors, such as around your house? 1.75 filed at 05/22/2025 0941   Can you walk a block or two on level ground?  2.75 filed at 05/22/2025 0941   Can you climb a flight of stairs or walk up a hill? 5.5 filed at 05/22/2025 0941   Can you run a short distance? 8 filed at 05/22/2025 0941   Can you do light work around the house like dusting or washing dishes? 2.7 filed at 05/22/2025 0941   Can you do moderate work around the house like vacuuming, sweeping floors or carrying groceries? 3.5 filed at 05/22/2025 0941   Can you do heavy work around the house like scrubbing floors or lifting and moving heavy furniture?  8 filed at 05/22/2025 0941   Can you do yard work like raking leaves, weeding or pushing a mower? 4.5 filed at 05/22/2025 0941   Can you have sexual relations? 5.25 filed at 05/22/2025 0941   Can you participate in moderate recreational activities like golf, bowling, dancing, doubles tennis or throwing a baseball or football? 6 filed at 05/22/2025 0941   Can you participate in strenous sports like swimming, singles tennis, football, basketball, or skiing? 7.5 filed at 05/22/2025 0941   DASI SCORE 58.2 filed at 05/22/2025 0941   METS Score (Will be calculated only when all the questions are answered) 9.9 filed at 05/22/2025 0941          Caprini DVT Assessment      Flowsheet Row Pre-Admission Testing from 5/22/2025 in [facility]   DVT Score (IF A SCORE IS NOT CALCULATING, MUST SELECT A BMI TO " COMPLETE) 4 filed at 05/22/2025 0940   Surgical Factors Minor surgery planned filed at 05/22/2025 0940   BMI (BMI MUST BE CHOSEN) 30 or less filed at 05/22/2025 0940          Modified Frailty Index    No data to display       [id number] Stroke Risk Points  Current as of 14 minutes ago        N/A 0 to 9 Points:      Last Change: N/A          The [id number] risk score (Lip HALLIE, et al. 2009. © 2010 [employer]) quantifies the risk of stroke for a patient with atrial fibrillation. For patients without atrial fibrillation or under the age of 18 this score appears as N/A. Higher score values generally indicate higher risk of stroke.        This score is not applicable to this patient. Components are not calculated.          Revised Cardiac Risk Index      Flowsheet Row Pre-Admission Testing from 5/22/2025 in [facility]   High-Risk Surgery (Intraperitoneal, Intrathoracic,Suprainguinal vascular) 0 filed at 05/22/2025 0942   History of ischemic heart disease (History of MI, History of positive exercuse test, Current chest paint considered due to myocardial ischemia, Use of nitrate therapy, ECG with pathological Q Waves) 0 filed at 05/22/2025 0942   History of congestive heart failure (pulmonary edemia, bilateral rales or S3 gallop, Paroxysmal nocturnal dyspnea, CXR showing pulmonary vascular redistribution) 0 filed at 05/22/2025 0942   History of cerebrovascular disease (Prior TIA or stroke) 0 filed at 05/22/2025 0942   Pre-operative insulin treatment 0 filed at 05/22/2025 0942   Pre-operative creatinine>2 mg/dl 0 filed at 05/22/2025 0942   Revised Cardiac Risk Calculator 0 filed at 05/22/2025 0942          Apfel Simplified Score      Flowsheet Row Pre-Admission Testing from 5/22/2025 in [facility]   Smoking status 1 filed at 05/22/2025 0942   History of motion sickness or PONV  0 filed at 05/22/2025 0942   Use of postoperative opioids 0 filed at 05/22/2025 0942    Gender - Female 0=No filed at 05/22/2025 0942   Apfel Simplified Score Calculator 1 filed at 05/22/2025 0942          Risk Analysis Index Results This Encounter    No data found in the last 10 encounters.       Stop Bang Score      Flowsheet Row Clinical Support from 5/14/2025 in [facility]   Do you snore loudly? 0 filed at 05/14/2025 0948   Do you often feel tired or fatigued after your sleep? 0 filed at 05/14/2025 0948   Has anyone ever observed you stop breathing in your sleep? 0 filed at 05/14/2025 0948   Do you have or are you being treated for high blood pressure? 0 filed at 05/14/2025 0948   Recent BMI (Calculated) 25.7 filed at 05/14/2025 0948   Is BMI greater than 35 kg/m2? 0=No filed at 05/14/2025 0948   Age older than 50 years old? 1=Yes filed at 05/14/2025 0948   Is your neck circumference greater than 17 inches (Male) or 16 inches (Female)? 0 filed at 05/14/2025 0948   Gender - Male 1=Yes filed at 05/14/2025 0948   STOP-BANG Total Score 2 filed at 05/14/2025 0948          Prodigy: High Risk  Total Score: 20              Prodigy Age Score      Prodigy Gender Score          ARISCAT Score for Postoperative Pulmonary Complications      Flowsheet Row Pre-Admission Testing from 5/22/2025 in [facility]   Age Calculated Score 3 filed at 05/22/2025 0942   Preoperative SpO2 0 filed at 05/22/2025 0942   Respiratory infection in the last month Either upper or lower (i.e., URI, bronchitis, pneumonia), with fever and antibiotic treatment 0 filed at 05/22/2025 0942   Preoperative anemia (Hgb less than 10 g/dl) 0 filed at 05/22/2025 0942   Surgical incision  0 filed at 05/22/2025 0942   Duration of surgery  0 filed at 05/22/2025 0942   Emergency Procedure  0 filed at 05/22/2025 0942   ARISCAT Total Score  3 filed at 05/22/2025 0942          Richardson Perioperative Risk for Myocardial Infarction or Cardiac Arrest (ZORAIDA)      Flowsheet Row Pre-Admission Testing from 5/22/2025 in   [facility]   Calculated Age Score 1.44 filed at 05/22/2025 0942   Functional Status  0 filed at 05/22/2025 0942   ASA Class  -3.29 filed at 05/22/2025 0942   Creatinine 0 filed at 05/22/2025 0942   Type of Procedure  -0.26 filed at 05/22/2025 0942   ZORAIDA Total Score  -7.36 filed at 05/22/2025 0942   ZORAIDA % 0.06 filed at 05/22/2025 0942            Assessment and Plan   75 yo male presents to [facility] for risk assessment and preoperative optimization in advance of his prostate marker insertion procedure    Today He is very hypertensive, he is afebrile, good pulse Ox     Neuro:  No diagnosis or significant findings on chart review or clinical presentation and evaluation.   The patient is at an increased risk for post operative delirium secondary to age >/= 65.  Preoperative brain exercise  discussed with patient. The patient is at an increased risk for perioperative stroke secondary age and comorbidities    HEENT/Airway:  No diagnosis or significant findings on chart review or clinical presentation and evaluation.     Cardiovascular:  Denies chest pain, shortness of breathe, dizziness, palpations, or lightheadedness    He states his BP is normal at home when he takes it, admits to not taking home BP for awhile. He states he has white coat syndrome. He is not on medication. BP very high at this visit.     He had a zero CAC score 2014, but 2019 was 27.5 so he had a stress test. Prior EKGs showing 1st degree AVB with LVH but today he is in a Mobitz I HR 50. I did discuss with Anesthesia staff Dr Moncada,     I did contact his PCP Dr Steward and arranged a Cardiology appt on 5/22/2025 with Dr Prater. Dr Smyth notified    H/O HPL- cont. Crestor asprescribed  Lab Results   Component Value Date    CHOL 202 (H) 07/11/2024    CHOL 190 05/05/2022    CHOL 203 (H) 11/03/2021     Lab Results   Component Value Date    HDL 62.2 07/11/2024    HDL 67.0 05/05/2022    HDL 76.6 11/03/2021     Lab Results   Component Value Date     "LDLCALC 121 (H) 07/11/2024     Lab Results   Component Value Date    TRIG 92 07/11/2024    TRIG 65 05/05/2022    TRIG 86 11/03/2021     No components found for: \"CHOLHDL\"    METS are  9.9, RCRI is 0 (3.9% risk for 30 day postoperative MACE)  ZORAIDA indicates a 0.06% risk of intraoperative or 30 day postoperative ZORAIDA  No additional preoperative testing is currently indicated.    EKG - 5/22/2025 Preliminary, SB, Mobitz I   Pulmonary:  No diagnosis or significant findings on chart review or clinical presentation and evaluation.     PRODIGY: Low risk for opioid induced respiratory depression  Discussed smoking cessation and deep breathing handout given    Renal:   No diagnosis or significant findings on chart review, or clinical presentation and evaluation.     Pt at Moderate risk for perioperative NASREEN based on Dynamic Predictive Scoring Tool for Perioperative NASREEN  Lab Results   Component Value Date    GLUCOSE 85 07/11/2024    CALCIUM 9.5 07/11/2024     07/11/2024    K 4.7 07/11/2024    CO2 29 07/11/2024     07/11/2024    BUN 21 07/11/2024    CREATININE 1.41 (H) 07/11/2024       Endocrine:  No diagnosis or significant findings on chart review or clinical presentation and evaluation.     Lab Results   Component Value Date    HGBA1C 6.0 (A) 05/05/2022       Hematologic:  No diagnosis or significant findings on chart review or clinical presentation and evaluation.  Lab Results   Component Value Date    WBC 7.9 07/11/2024    HGB 15.7 07/11/2024    HCT 48.8 07/11/2024    MCV 88 07/11/2024     07/11/2024       CAPRINI SCORE 4  Pt supplied education/VTE handout    Gastrointestinal:   No diagnosis or significant findings on chart review or clinical presentation and evaluation.   EAT-10 score of 0 - self-perceived oropharyngeal dysphagia scale (0-40)      :  See HPI    Infectious disease:   No diagnosis or significant findings on chart review or clinical presentation and evaluation.     Musculoskeletal:   No " diagnosis or significant findings on chart review or clinical presentation and evaluation.   Last dose of NSAIDS 5/26/2025  Instructed if  no issues with your liver you may take Tylenol 2-500 mg tablets every 8 hrs around the clock for pain including morning of surgery with a sip of water    Preoperative risk assessment  ASA II  NSQIP - Predicted length of stay days 0  PAT Testing - cbc, bmp, EKG    5/22/25 Cards Dr Prater    Anesthesia:  No anesthesia complications    denies dental issues  Smoker-denies  Drugs-denies  ETOH-occasional  denies personal/family issues with Anesthesia    Face to Face patient contact time 60 min    MICHAEL Jackson-CNP 5/22/2025 9:44 AM               [1]   Past Medical History:  Diagnosis Date    Arrhythmia     Arthritis     BPH (benign prostatic hyperplasia)     Cataract     Dislocated shoulder     multiple times on the right    Encounter for general adult medical examination without abnormal findings 05/03/2021    Medicare annual wellness visit, initial    Essential (primary) hypertension 06/08/2020    White coat syndrome with hypertension    Hearing aid worn     HL (hearing loss)     Hyperlipidemia     Impacted cerumen, bilateral 03/28/2019    Excessive cerumen in both ear canals    Joint pain     Other specified health status     No pertinent past medical history    Personal history of colonic polyps 03/18/2019    History of colonic polyps    Personal history of other diseases of the musculoskeletal system and connective tissue 01/13/2020    History of dislocation of shoulder    Personal history of other diseases of the musculoskeletal system and connective tissue 01/08/2020    History of dislocation of shoulder    Prostate cancer (Multi)     Strain of unspecified muscle(s) and tendon(s) at lower leg level, left leg, initial encounter 06/17/2020    Strain of left knee, initial encounter   [2]   Past Surgical History:  Procedure Laterality Date    COLONOSCOPY      PROSTATE BIOPSY       SHOULDER SURGERY     [3]   Family History  Problem Relation Name Age of Onset    Cancer Mother      Other (cardiac disorder) Father      Diabetes Father      Heart disease Father      Other (cabg) Father      Diabetes Brother     [4] No Known Allergies

## 2025-05-22 NOTE — TELEPHONE ENCOUNTER
Pt called asking if the medication could be sent to [facility] instead of [facility]?

## 2025-05-22 NOTE — PREPROCEDURE INSTRUCTIONS
Medication List            Accurate as of May 22, 2025  8:48 AM. Always use your most recent med list.                glucosamine-chondroitin 500-400 mg tablet  Additional Medication Adjustments for Surgery: Take last dose 7 days before surgery     multivitamin tablet  Additional Medication Adjustments for Surgery: Take last dose 7 days before surgery     rosuvastatin 10 mg tablet  Commonly known as: Crestor  TAKE 1 TABLET ONE TIME DAILY  Medication Adjustments for Surgery: Take/Use as prescribed     TURMERIC ORAL  Additional Medication Adjustments for Surgery: Take last dose 7 days before surgery            If no issues with your liver you may take Tylenol 2-500 mg tablets every 8 hrs around the clock for pain including morning of surgery with a sip of water    STOP VITAMINS, SUPPLEMENTS, HERBS, PROBIOTICS, AND FISH OIL, NO MOTRIN OR ADVIL OR ALEVE 7 DAYS BEFORE SURGERY    IF YOU HAVE A CPAP MACHINE BRING ON DAY OF SURGERY    Additional Instructions:  if not a joint replacement, body wash and mouth wash do not pertain to you     Seven/Six Days before Surgery:  Review your medication instructions, stop indicated medications  Five Days before Surgery:  Review your medication instructions, stop indicated medications  Three Days before Surgery:  Review your medication instructions, stop indicated medications  The Day before Surgery:  No smoking or alcohol use 24 hours before surgery  Review your medication instructions, stop indicated medications  You will be contacted regarding the time of your arrival to facility and surgery time  Do not eat any food after Midnight  Day of Surgery:  Review your medication instructions, take indicated medications  If you have diabetes, please check your fasting blood sugar upon awakening.  If fasting blood sugar is <80 mg/dl, drink 100 ml of apple juice, time limit of 2 hours before  You may have clear liquids until TWO hours before surgery/procedure.  This includes water, black  tea/coffee, (no milk or cream) apple juice and electrolyte drinks (Gatorade)  You may chew gum up to TWO hours before your surgery/procedure  Wear  comfortable loose fitting clothing  Do not use moisturizers, creams, lotions or perfume  All jewelry and valuables should be left at home     CONTACT SURGEON'S OFFICE IF YOU DEVELOP:  * Fever = 100.4 F   * New respiratory symptoms (e.g. cough, shortness of breath, respiratory distress, sore throat)  * Recent loss of taste or smell  *Flu like symptoms such as headache, fatigue or gastrointestinal symptoms  * You develop any open sores, shingles, burning or painful urination   AND/OR:  * You no longer wish to have the surgery.  * Any other personal circumstances change that may lead to the need to cancel or defer this surgery.  *You were admitted to any hospital within one week of your planned procedure.     SMOKING:  *Quitting smoking can make a huge difference to your health and recovery from surgery.    *If you need help with quitting, call 800-QUIT-NOW.     Preoperative Fasting Guidelines     Why must I stop eating and drinking near surgery time?  With sedation, food or liquid in your stomach can enter your lungs causing serious complications  Increases nausea and vomiting     When do I need to stop eating and drinking before my surgery?  Do not eat any food after midnight the night before your surgery/procedure.  You may have up to TEN ounces of clear liquid until TWO hours before your instructed arrival time to the hospital.  This includes water, black tea/coffee, (no milk or cream) apple juice, and electrolyte drinks (Gatorade)  You may chew gum until TWO hours before your surgery/procedure     SURGICAL TIME:  *You will be contacted between 11 am and 3 pm  the business day before your surgery with your arrival time.  *If you haven't received a call by 3pm, call your surgeons office, do not rely on MyChart time as this is not confirmed until the day before  surgery.  *Scheduled surgery times may change and you will be notified if this occurs-check your personal voicemail for any updates.     ON THE MORNING OF SURGERY:  *Wear comfortable, loose fitting clothing.   *Do not use moisturizers, creams, lotions or perfume.  *All jewelry and valuables should be left at home.  *Prosthetic devices such as contact lenses, hearing aids, dentures, eyelash extensions, hairpins and body piercing must be removed before surgery.     BRING WITH YOU:  *Photo ID and insurance card  *Current list of medications and allergies  *Pacemaker/Defibrillator/Heart stent cards  *CPAP machine and mask  *Slings/splints/crutches  *Copy of your complete Advanced Directive/DHPOA-if applicable  *Neurostimulator implant remote     PARKING AND ARRIVAL:  *Check in at the Main Entrance desk and let them know you are here for surgery.     IF YOU ARE HAVING OUTPATIENT/SAME DAY SURGERY:  *A responsible adult MUST accompany you at the time of discharge and stay with you for 24 hours after your surgery.  *You may NOT drive yourself home after surgery.  *You may use a taxi or ride sharing service ([employer], Uber) to return home ONLY if you are accompanied by a friend or family member.  *Instructions for resuming your medications will be provided by your surgeon.     Thank you for coming to Pre Admission testing.      If I have prescribe medication please don't forget to  at your pharmacy.      Any questions about today's visit call [phone] and leave a message in the general mailbox.     Patient instructed to ambulate as soon as possible postoperatively to decrease thromboembolic risk.     Gregorio Watson, APRN-CNP     Thank you for visiting [facility].  If you have any changes to your health condition, please call the surgeons office to alert them and give them details of your symptoms.     Additional Instructions:      The Day before Surgery:  -Review your medication  instructions, stop indicated medications  -You will be contacted in the evening regarding the time of your arrival to facility and surgery time     Day of Surgery:  -Review your medication instructions, take indicated medications  -Wear comfortable loose fitting clothing  -Do not use moisturizers, creams, lotions or perfume  -All jewelry and valuables should be left at home              Preoperative Brain Exercises     What are brain exercises?  A brain exercise is any activity that engages your thinking (cognitive) skills.     What types of activities are considered brain exercises?  Jigsaw puzzles, crossword puzzles, word jumble, memory games, word search, and many more.  Many can be found free online or on your phone via a mobile jose.     Why should I do brain exercises before my surgery?  More recent research has shown brain exercise before surgery can lower the risk of postoperative delirium (confusion) which can be especially important for older adults.  Patients who did brain exercises for 5 to 10 minutes a day the days before surgery, cut their risk of postoperative delirium in half up to 1 week after surgery.                 [facility]     Preoperative Deep Breathing Exercises     Why it is important to do deep breathing exercises before my surgery?  Deep breathing exercises strengthen your breathing muscles.  This helps you to recover after your surgery and decreases the chance of breathing complications.      How are the deep breathing exercises done?  Sit straight with your back supported.  Breathe in deeply and slowly through your nose. Your lower rib cage should expand and your abdomen may move forward.  Hold that breath for 3 to 5 seconds.  Breathe out through pursed lips, slowly and completely.  Rest and repeat 10 times every hour while awake.  Rest longer if you become dizzy or lightheaded.                      [facility]  Preoperative Deep  Breathing Exercises     Why it is important to do deep breathing exercises before my surgery?  Deep breathing exercises strengthen your breathing muscles.  This helps you to recover after your surgery and decreases the chance of breathing complications.        How are the deep breathing exercises done?  Sit straight with your back supported.  Breathe in deeply and slowly through your nose. Your lower rib cage should expand and your abdomen may move forward.  Hold that breath for 3 to 5 seconds.  Breathe out through pursed lips, slowly and completely.  Rest and repeat 10 times every hour while awake.  Rest longer if you become dizzy or lightheaded.        Patient Information: Incentive Spirometer  What is an incentive spirometer?  An incentive spirometer is a device used before and after surgery to “exercise” your lungs.  It helps you to take deeper breaths to expand your lungs.  Below is an example of a basic incentive spirometer.  The device you receive may differ slightly but they all function the same.    Why do I need to use an incentive spirometer?  Using your incentive spirometer prepares your lungs for surgery and helps prevent lung problems after surgery.  How do I use my incentive spirometer?  When you're using your incentive spirometer, make sure to breathe through your mouth. If you breathe through your nose, the incentive spirometer won't work properly. You can hold your nose if you have trouble.  If you feel dizzy at any time, stop and rest. Try again at a later time.  Follow the steps below:  Set up your incentive spirometer, expand the flexible tubing and connect to the outlet.  Sit upright in a chair or bed. Hold the incentive spirometer at eye level.   Put the mouthpiece in your mouth and close your lips tightly around it. Slowly breathe out (exhale) completely.  Breathe in (inhale) slowly through your mouth as deeply as you can. As you take a breath, you will see the piston rise inside the large  column. While the piston rises, the indicator should move upwards. It should stay in between the 2 arrows (see Figure).  Try to get the piston as high as you can, while keeping the indicator between the arrows.   If the indicator doesn't stay between the arrows, you're breathing either too fast or too slow.  When you get it as high as you can, hold your breath for 10 seconds, or as long as possible. While you're holding your breath, the piston will slowly fall to the base of the spirometer.  Once the piston reaches the bottom of the spirometer, breathe out slowly through your mouth. Rest for a few seconds.  Repeat 10 times. Try to get the piston to the same level with each breath.  Repeat every hour while awake  You can carefully clean the outside of the mouthpiece with an alcohol wipe or soap and water.       Patient and Family Education        Ways You Can Help Prevent Blood Clots               This handout explains some simple things you can do to help prevent blood clots.      Blood clots are blockages that can form in the body's veins. When a blood clot forms in your deep veins, it may be called a deep vein thrombosis, or DVT for short. Blood clots can happen in any part of the body where blood flows, but they are most common in the arms and legs. If a piece of a blood clot breaks free and travels to the lungs, it is called a pulmonary embolus (PE). A PE can be a very serious problem.         Being in the hospital or having surgery can raise your chances of getting a blood clot because you may not be well enough to move around as much as you normally do.         Ways you can help prevent blood clots in the hospital           Wearing SCDs. SCDs stands for Sequential Compression Devices.   SCDs are special sleeves that wrap around your legs  They attach to a pump that fills them with air to gently squeeze your legs every few minutes.   This helps return the blood in your legs to your heart.   SCDs should only be  taken off when walking or bathing.   SCDs may not be comfortable, but they can help save your life.                                            Wearing compression stockings - if your doctor orders them. These special snug fitting stockings gently squeeze your legs to help blood flow.       Walking. Walking helps move the blood in your legs.   If your doctor says it is ok, try walking the halls at least   5 times a day. Ask us to help you get up, so you don't fall.      Taking any blood thinning medicines your doctor orders.        Page 1 of 2            [facility]; 3/23   Ways you can help prevent blood clots at home         Wearing compression stockings - if your doctor orders them. ? Walking - to help move the blood in your legs.       Taking any blood thinning medicines your doctor orders.      Signs of a blood clot or PE        Tell your doctor or nurse know right away if you have of the problems listed below.    If you are at home, seek medical care right away. Call 911 for chest pain or problems breathing.                Signs of a blood clot (DVT) - such as pain,  swelling, redness or warmth in your arm or leg      Signs of a pulmonary embolism (PE) - such as chest pain or feeling short of breath  MICHAEL Jackson-CNP  Thank you for visiting the [facility].  If you have any changes to your health condition, please call the surgeons office to alert them and give them details of your symptoms.                               PAST MEDICAL HISTORY:  Anemia     Bipolar 1 disorder     Constipation     Dyspepsia     Epilepsy last event 4+ yrs ago    Hypothyroidism     Major depressive disorder     MR (mental retardation)     Schizophrenia

## 2025-05-22 NOTE — TELEPHONE ENCOUNTER
----- Message from Evert Steward sent at 5/22/2025  9:00 AM EDT -----  Pts BP has been running high.  I am calling something in for him to start taking before I see him next month.  Check his BP's at home and bring in a list and call if BP not controlled <140/90 after 2 weeks.

## 2025-05-23 ENCOUNTER — OFFICE VISIT (OUTPATIENT)
Facility: CLINIC | Age: 75
End: 2025-05-23
Payer: MEDICARE

## 2025-05-23 ENCOUNTER — PATIENT MESSAGE (OUTPATIENT)
Facility: CLINIC | Age: 75
End: 2025-05-23

## 2025-05-23 VITALS
OXYGEN SATURATION: 100 % | WEIGHT: 160 LBS | SYSTOLIC BLOOD PRESSURE: 189 MMHG | HEART RATE: 48 BPM | DIASTOLIC BLOOD PRESSURE: 98 MMHG | BODY MASS INDEX: 25.06 KG/M2

## 2025-05-23 DIAGNOSIS — Z01.810 PREOPERATIVE CARDIOVASCULAR EXAMINATION: Primary | ICD-10-CM

## 2025-05-23 DIAGNOSIS — I10 ESSENTIAL HYPERTENSION: ICD-10-CM

## 2025-05-23 DIAGNOSIS — I44.1 SECOND DEGREE AV BLOCK, MOBITZ TYPE I: ICD-10-CM

## 2025-05-23 PROCEDURE — 1159F MED LIST DOCD IN RCRD: CPT | Performed by: INTERNAL MEDICINE

## 2025-05-23 PROCEDURE — 1036F TOBACCO NON-USER: CPT | Performed by: INTERNAL MEDICINE

## 2025-05-23 PROCEDURE — 3080F DIAST BP >= 90 MM HG: CPT | Performed by: INTERNAL MEDICINE

## 2025-05-23 PROCEDURE — 3077F SYST BP >= 140 MM HG: CPT | Performed by: INTERNAL MEDICINE

## 2025-05-23 PROCEDURE — 99214 OFFICE O/P EST MOD 30 MIN: CPT | Performed by: INTERNAL MEDICINE

## 2025-05-23 PROCEDURE — 1126F AMNT PAIN NOTED NONE PRSNT: CPT | Performed by: INTERNAL MEDICINE

## 2025-05-23 PROCEDURE — 99204 OFFICE O/P NEW MOD 45 MIN: CPT | Performed by: INTERNAL MEDICINE

## 2025-05-23 ASSESSMENT — ENCOUNTER SYMPTOMS
DEPRESSION: 0
LOSS OF SENSATION IN FEET: 0
OCCASIONAL FEELINGS OF UNSTEADINESS: 0

## 2025-05-23 ASSESSMENT — LIFESTYLE VARIABLES: TOTAL SCORE: 0

## 2025-05-23 ASSESSMENT — PAIN SCALES - GENERAL: PAINLEVEL_OUTOF10: 0-NO PAIN

## 2025-05-23 NOTE — PROGRESS NOTES
[facility] [place]        Subjective   Chief Complaint   Patient presents with    cardiac clearance      Here for preoperative cardiovascular risk assessment with a history of prostate cancer is noted per his treating physicians.     HPI 73 yo male referred to PAT by Dr Smyth for evaluation in advance of his fiducial prostate marker insertion on 6/3/2025. Per Dr Espinoza:  NCCN FIR. Stage II cT1c N0 M0 GG2, 4/12 cores PSA 9.55. Decipher 0.64.   He will have CT sim June 9 here, then start SBRT ~2 weeks after this at [facility]. He should complete by mid July.     He has no history of previous myocardial infarction, heart failure, valvular heart disease, syncope, sustained arrhythmias, pericarditis.  Angina Free and no signs of fluid retention    He had stress echocardiography January 31, 2022 and had a hypertensive but nonischemic stress echocardiogram at a 9.1 met workload.  His echocardiogram did not show evidence of structural heart disease.    Preoperative EKG on May 22, 2025 shows normal sinus rhythm with second-degree AV block type I Wenckebach with an underlying atrial rate of 75 bpm and a ventricular rate of 50 bpm.    He has a past medical history of Arrhythmia, Arthritis, BPH (benign prostatic hyperplasia), Cataract, Dislocated shoulder, Encounter for general adult medical examination without abnormal findings (05/03/2021), Essential (primary) hypertension (06/08/2020), Hearing aid worn, HL (hearing loss), Hyperlipidemia, Impacted cerumen, bilateral (03/28/2019), Joint pain, Other specified health status, Personal history of colonic polyps (03/18/2019), Personal history of other diseases of the musculoskeletal system and connective tissue (01/13/2020), Personal history of other diseases of the musculoskeletal system and connective tissue (01/08/2020), Prostate cancer (Multi), and Strain of unspecified muscle(s) and tendon(s) at lower leg level, left leg, initial  encounter (06/17/2020).  He has a past surgical history that includes Prostate biopsy; Colonoscopy; and Shoulder surgery.   No relevant family history has been documented for this patient.  Current Outpatient Medications   Medication Sig Dispense Refill    glucosamine-chondroitin 500-400 mg tablet Take 1 tablet by mouth.      multivitamin tablet Take 1 tablet by mouth once daily.      rosuvastatin (Crestor) 10 mg tablet TAKE 1 TABLET ONE TIME DAILY 90 tablet 0    TURMERIC ORAL Take by mouth.      valsartan (Diovan) 160 mg tablet Take 1 tablet (160 mg) by mouth once daily. 90 tablet 1     No current facility-administered medications for this visit.      reports that he has never smoked. He has never used smokeless tobacco. He reports current alcohol use of about 1.0 standard drink of alcohol per week. He reports that he does not use drugs.  Allergies:  Patient has no known allergies.    ROS: See HPI  CONSTITUTIONAL: Chills- none. Fever- none. Weight change appropriate for age.  HEENT: Headache- Negative.  Change in vision- none.  Ear pain- none. Nasal congestion- none. Post-nasal drip-none.  Sore throat-none.  CARDIOLOGY: Chest pain- none.  Leg edema-trace.  Murmurs-soft systolic.  Palpitation- none.  RESPIRATORY: Denies any shortness of breath.  GI: Abdominal pain- none.  Change in bowel habits- none.  Constipation- none.  Diarrhea- none.  Nausea- none.  Vomiting- none.  MUSCULOSKELETAL: Joint pain- none.  Muscle aches- none.  DERMATOLOGY: Rash- none.  NEUROLOGY: Dizziness- none.   Headache- none.  PSYCHIATRY: Denies any depression or anxiety     Vitals:    05/23/25 0856 05/23/25 0903   BP: (!) 198/90 (!) 189/98   Pulse: (!) 48    SpO2: 100%    Weight: 72.6 kg (160 lb)    PainSc: 0-No pain       BMI:Body mass index is 25.06 kg/m².   General Cardiology:  General Appearance: Alert, oriented and in no acute distress.  HEENT: extra ocular movements intact (EOMI), pupils equal,  round, reactive to light and accommodation  "(PERRLA).  Carotid Upstroke: no bruit, normal.  Jugular Venous Distention (JVD): flat.  Chest: normal.  Lungs: Clear to auscultation,   Heart Sounds: no S3 or S4, normal S1, S2, regular rate.  No Murmur, Click, Gallop: s  Abdomen: no hepatomegaly, no masses felt, soft.  Extremities: no leg edema.  Peripheral pulses: 2 plus bilateral.  NEUROLOGY Cranial nerves II-XII grossly intact.     Last Labs:  CMP:  Recent Labs     05/22/25  0938 07/11/24  1112 05/05/22  0806    138 137   K 5.0 4.7 4.1   CL 98 103 100   CO2 27 29 27   ANIONGAP 18 11 14   BUN 13 21 22   CREATININE 1.22 1.41* 1.24   EGFR 63 53*  --    GLUCOSE 140* 85 107*     Recent Labs     07/11/24  1112 05/05/22  0806 11/03/21  0948   ALBUMIN 4.5 4.4 4.5   ALKPHOS 38 43 62   ALT 22 28 32   AST 26 33 31   BILITOT 1.0 0.9 0.8     CBC:  Recent Labs     05/22/25  0938 07/11/24  1112 05/05/22  0806   WBC 9.9 7.9 7.0   HGB 15.9 15.7 14.8   HCT 50.2 48.8 46.9    235 213   MCV 85 88 89     COAG: No results for input(s): \"INR\", \"DDIMERVTE\" in the last 13338 hours.  HEME/ENDO:  Recent Labs     05/05/22  0806 11/03/21  0948 05/04/21  1005   HGBA1C 6.0* 6.3* 6.0      CARDIAC: No results for input(s): \"LDH\", \"CKMB\", \"TROPHS\", \"BNP\" in the last 43511 hours.    No lab exists for component: \"CK\", \"CKMBP\"  Recent Labs     07/11/24  1112 05/05/22  0806 11/03/21  0948 05/04/21  1005   CHOL 202* 190 203* 203*   LDLF  --  110* 109* 112*   LDLCALC 121*  --   --   --    HDL 62.2 67.0 76.6 72.0   TRIG 92 65 86 97       Last Cardiology Tests:  Echo:  Echo Results:  No results found for this or any previous visit from the past 3650 days.     Cath:  Stress Test:  Stress Results:  No results found for this or any previous visit from the past 365 days.     Cardiac Imaging:    Problem List Items Addressed This Visit       Preoperative cardiovascular examination - Primary    Essential hypertension    Second degree AV block, Mobitz type I      Regarding his preoperative " cardiovascular risk he is a mildly increased risk and can proceed with his planned prostate interventions.    He has been started on valsartan for his hypertension management which is appropriate    Regarding his history of slow heart rhythms, he has asymptomatic bradycardia is with his EKG showing evidence of sinus rhythm with a second-degree AV block type I, and he should avoid medication that can affect his atrial rate or worsen his AV conduction and I discussed this finding with him.  I suggested a 6-month follow-up with me so we can monitor his cardiac rhythm.    Regarding his hypertension, he has a history of whitecoat hypertension and when he was recently placed on valsartan for his hypertension he noted blood pressures at home in the 160 range systolic, and today's blood pressure in the office is consistent with whitecoat hypertension.  I suggested that he start a blood pressure diary each day and check his blood pressure an hour after his valsartan medication and bring those measurements to medical appointments.        Pt. care time is spent includes review of diagnostic tests, labs, radiographs, EKGs, old echoes, cardiac work-up and coordination of care. Assessment, impression and plans are reflected in the note above as well as the orders.    Artemio Prater,   [facility] [place]  [facility]

## 2025-05-25 LAB
ATRIAL RATE: 71 BPM
P AXIS: 67 DEGREES
Q ONSET: 219 MS
QRS COUNT: 9 BEATS
QRS DURATION: 92 MS
QT INTERVAL: 510 MS
QTC CALCULATION(BAZETT): 464 MS
QTC FREDERICIA: 480 MS
R AXIS: -3 DEGREES
T AXIS: 3 DEGREES
T OFFSET: 474 MS
VENTRICULAR RATE: 50 BPM

## 2025-05-27 ENCOUNTER — APPOINTMENT (OUTPATIENT)
Dept: PRIMARY CARE | Facility: CLINIC | Age: 75
End: 2025-05-27
Payer: MEDICARE

## 2025-05-27 VITALS
SYSTOLIC BLOOD PRESSURE: 136 MMHG | BODY MASS INDEX: 24.96 KG/M2 | DIASTOLIC BLOOD PRESSURE: 84 MMHG | OXYGEN SATURATION: 98 % | TEMPERATURE: 97.6 F | HEIGHT: 67 IN | WEIGHT: 159 LBS | HEART RATE: 65 BPM

## 2025-05-27 DIAGNOSIS — Z00.00 MEDICARE ANNUAL WELLNESS VISIT, SUBSEQUENT: ICD-10-CM

## 2025-05-27 DIAGNOSIS — Z00.00 ROUTINE ADULT HEALTH MAINTENANCE: ICD-10-CM

## 2025-05-27 DIAGNOSIS — R73.01 IFG (IMPAIRED FASTING GLUCOSE): ICD-10-CM

## 2025-05-27 DIAGNOSIS — E78.2 MIXED HYPERLIPIDEMIA: Primary | ICD-10-CM

## 2025-05-27 DIAGNOSIS — I10 PRIMARY HYPERTENSION: ICD-10-CM

## 2025-05-27 PROCEDURE — 99214 OFFICE O/P EST MOD 30 MIN: CPT | Performed by: FAMILY MEDICINE

## 2025-05-27 PROCEDURE — 3008F BODY MASS INDEX DOCD: CPT | Performed by: FAMILY MEDICINE

## 2025-05-27 PROCEDURE — 3079F DIAST BP 80-89 MM HG: CPT | Performed by: FAMILY MEDICINE

## 2025-05-27 PROCEDURE — 99397 PER PM REEVAL EST PAT 65+ YR: CPT | Performed by: FAMILY MEDICINE

## 2025-05-27 PROCEDURE — 1036F TOBACCO NON-USER: CPT | Performed by: FAMILY MEDICINE

## 2025-05-27 PROCEDURE — G0439 PPPS, SUBSEQ VISIT: HCPCS | Performed by: FAMILY MEDICINE

## 2025-05-27 PROCEDURE — 3075F SYST BP GE 130 - 139MM HG: CPT | Performed by: FAMILY MEDICINE

## 2025-05-27 PROCEDURE — 1159F MED LIST DOCD IN RCRD: CPT | Performed by: FAMILY MEDICINE

## 2025-05-27 RX ORDER — VALSARTAN 160 MG/1
160 TABLET ORAL DAILY
Qty: 90 TABLET | Refills: 3 | Status: SHIPPED | OUTPATIENT
Start: 2025-05-27 | End: 2026-05-22

## 2025-05-27 RX ORDER — ROSUVASTATIN CALCIUM 10 MG/1
10 TABLET, COATED ORAL DAILY
Qty: 90 TABLET | Refills: 3 | Status: SHIPPED | OUTPATIENT
Start: 2025-05-27

## 2025-05-27 NOTE — PROGRESS NOTES
"Subjective   Reason for Visit: Giacomo Garay is an 74 y.o. male here for a Medicare Wellness visit.     Past Medical, Surgical, and Family History reviewed and updated in chart.    Reviewed all medications by prescribing practitioner or clinical pharmacist (such as prescriptions, OTCs, herbal therapies and supplements) and documented in the medical record.    HPI    Patient Care Team:  Evert Steward MD as PCP - General (Family Medicine)  Moo Traore MD as PCP - [employer]vantage PCP  Kaz Herman MD as Consulting Physician (Urology)     Review of Systems   All other systems reviewed and are negative.    Preparing meals - independent  Using telephone - independent  Bladder - continent  Bowel - continent    Recent hospital stays - none    ADLs - able to dress, walk and bath independently  Instrumental ADL's - able to shop, maintain finances, managing medications, housekeeping independently    Depression: PHQ-2 (screening 2 mins) - negative    Opioid use -   none  Exercise -  stays active  Diet - well balanced  Pain score - none    Retired     Providers    No cognitive impairment appreciated    Education - educated pt on healthy eating, exercise, weight loss    Falls - none    Counseled pt on living will    AAA screening:  NA     Prostate CA screen:  treated for prostate CA    CV screening:  CA score was 27.6    Colonoscopy: reordered    Diabetes screening:  none    Glaucoma screen:  sees optho    CT chest tob screen: NA    Vaccines:  Talked to pt about seeing pharmacy about his pneumonia shots and      HTN: BP was not controlled at previous urology appt.  Taking valsartan now and doing well.    Hyperlipidemia: doing well on rosuvastatin.    Objective   Vitals:  /84   Pulse 65   Temp 36.4 °C (97.6 °F)   Ht 1.702 m (5' 7\")   Wt 72.1 kg (159 lb)   SpO2 98%   BMI 24.90 kg/m²       Physical Exam  Vitals reviewed.   Constitutional:       General: He is not in acute " distress.     Appearance: Normal appearance. He is well-developed. He is not diaphoretic.   HENT:      Head: Normocephalic and atraumatic.      Right Ear: Tympanic membrane normal.      Left Ear: Tympanic membrane normal.      Nose: Nose normal.      Mouth/Throat:      Mouth: Mucous membranes are moist.   Eyes:      Pupils: Pupils are equal, round, and reactive to light.   Cardiovascular:      Rate and Rhythm: Normal rate and regular rhythm.      Heart sounds: Normal heart sounds. No murmur heard.     No friction rub. No gallop.   Pulmonary:      Effort: Pulmonary effort is normal.      Breath sounds: Normal breath sounds. No rales.   Abdominal:      General: Bowel sounds are normal.      Palpations: Abdomen is soft.      Tenderness: There is no abdominal tenderness.   Musculoskeletal:      Cervical back: Normal range of motion and neck supple.   Skin:     General: Skin is warm and dry.   Neurological:      Mental Status: He is alert.   Psychiatric:         Mood and Affect: Mood normal.         Assessment/Plan   Problem List Items Addressed This Visit       Hyperlipidemia - Primary    Relevant Orders    Comprehensive metabolic panel    Hemoglobin A1c    Lipid panel    CBC and Auto Differential     Other Visit Diagnoses         IFG (impaired fasting glucose)        Relevant Orders    Hemoglobin A1c          Patient is doing well.  Refilled pts meds.  Follow up in 12 mo.    Clear for surgery.

## 2025-05-29 ENCOUNTER — TELEPHONE (OUTPATIENT)
Facility: CLINIC | Age: 75
End: 2025-05-29
Payer: MEDICARE

## 2025-05-29 NOTE — TELEPHONE ENCOUNTER
Called pt per Dr Prater to inform him we scheduled a consultation with Dr Max at 9:15 on 6/5. Also told pt to reschedule his appt for prostate marker insertion he had scheduled. Pt voiced understanding

## 2025-05-30 ENCOUNTER — TELEPHONE (OUTPATIENT)
Dept: UROLOGY | Facility: CLINIC | Age: 75
End: 2025-05-30
Payer: MEDICARE

## 2025-05-30 NOTE — TELEPHONE ENCOUNTER
Patient requested to have his procedure moved back as he has to see an cardiologist. Picked new date of 6/18/25. Informational packet sent.

## 2025-06-04 NOTE — PROGRESS NOTES
Chief Complaint   Patient presents with    2nd degree block        The patient is a 74-year-old male with a history of prostate cancer.  This in the setting of preserved LV systolic function and a preoperative workup that was notable for second-degree AV block Mobitz type I.  He is seeing me for further evaluation regarding this issue.  Patient is extremely active.  He is a former runner he is accustomed to running 4 miles a day until his knee pain became unbearable.  He currently utilizes a Peloton stationary bicycle and augments his heart rate to the 120 bpm range without difficulty.  He wears an Apple Watch and does note that his heart rate often is low but he is completely asymptomatic.  He had 1 episode of near syncope in the setting of a dislocated shoulder while they were trying to realign his joint and giving him narcotics without full anesthesia.  He also had 1 episode of jatin syncope in the setting of a significant GI infection with thickened diarrhea.  He takes no AV prince blocking agents.  He also notes that his blood pressure is usually in the normal range and only elevated in the setting of seeing physicians in the office.  He had a normal stress echo 3 years ago with proper augmentation of his heart rates at 122 bpm.           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    Right shoulder strain 10/01/2023    Syncope 10/01/2023    BMI 25.0-25.9,adult 10/01/2023    Prostate cancer (Multi) 02/06/2025    Preoperative cardiovascular  examination 05/23/2025    Essential hypertension 05/23/2025    Second degree AV block, Mobitz type I 05/23/2025     Resolved Ambulatory Problems     Diagnosis Date Noted    No Resolved Ambulatory Problems     Past Medical History:   Diagnosis Date    Arrhythmia     Arthritis     BPH (benign prostatic hyperplasia)     Cataract     Dislocated shoulder     Encounter for general adult medical examination without abnormal findings 05/03/2021    Essential (primary) hypertension 06/08/2020    Hearing aid worn     HL (hearing loss)     Impacted cerumen, bilateral 03/28/2019    Joint pain     Other specified health status     Personal history of colonic polyps 03/18/2019    Personal history of other diseases of the musculoskeletal system and connective tissue 01/13/2020    Personal history of other diseases of the musculoskeletal system and connective tissue 01/08/2020    Strain of unspecified muscle(s) and tendon(s) at lower leg level, left leg, initial encounter 06/17/2020        Review of Systems   All other systems reviewed and are negative.         Current Outpatient Medications   Medication Instructions    glucosamine-chondroitin 500-400 mg tablet 1 tablet    multivitamin tablet 1 tablet, Daily    rosuvastatin (CRESTOR) 10 mg, oral, Daily    TURMERIC ORAL Take by mouth.    valsartan (DIOVAN) 160 mg, oral, Daily       Objective     There were no vitals filed for this visit.     Vitals and nursing note reviewed.   Constitutional:       Appearance: Healthy appearance.   HENT:    Mouth/Throat:      Pharynx: Oropharynx is clear.   Pulmonary:      Effort: Pulmonary effort is normal.      Breath sounds: Normal breath sounds.   Cardiovascular:      PMI at left midclavicular line. Bradycardia present. Irregular rhythm. Normal S1. Normal S2.       Murmurs: There is a grade 1/6 holosystolic murmur.      No gallop.  No click. No rub.   Pulses:     Intact distal pulses.   Edema:     Peripheral edema absent.   Abdominal:       General: Bowel sounds are normal.   Musculoskeletal:      Cervical back: Normal range of motion. Skin:     General: Skin is warm and dry.   Neurological:      General: No focal deficit present.      Mental Status: Alert and oriented to person, place and time.            Lab Review:   Pre-Admission Testing on 05/22/2025   Component Date Value    Glucose 05/22/2025 140 (H)     Sodium 05/22/2025 138     Potassium 05/22/2025 5.0     Chloride 05/22/2025 98     Bicarbonate 05/22/2025 27     Anion Gap 05/22/2025 18     Urea Nitrogen 05/22/2025 13     Creatinine 05/22/2025 1.22     eGFR 05/22/2025 63     Calcium 05/22/2025 10.2     WBC 05/22/2025 9.9     nRBC 05/22/2025      RBC 05/22/2025 5.93 (H)     Hemoglobin 05/22/2025 15.9     Hematocrit 05/22/2025 50.2     MCV 05/22/2025 85     MCH 05/22/2025 26.8     MCHC 05/22/2025 31.7 (L)     RDW 05/22/2025 12.8     Platelets 05/22/2025 259     Neutrophils % 05/22/2025 62.9     Immature Granulocytes %,* 05/22/2025 0.1     Lymphocytes % 05/22/2025 27.5     Monocytes % 05/22/2025 8.7     Eosinophils % 05/22/2025 0.5     Basophils % 05/22/2025 0.3     Neutrophils Absolute 05/22/2025 6.25 (H)     Immature Granulocytes Ab* 05/22/2025 0.01     Lymphocytes Absolute 05/22/2025 2.73     Monocytes Absolute 05/22/2025 0.86 (H)     Eosinophils Absolute 05/22/2025 0.05     Basophils Absolute 05/22/2025 0.03     Ventricular Rate 05/22/2025 50     Atrial Rate 05/22/2025 71     QRS Duration 05/22/2025 92     QT Interval 05/22/2025 510     QTC Calculation(Bazett) 05/22/2025 464     P Axis 05/22/2025 67     R [place] 05/22/2025 -3     T [place] 05/22/2025 3     QRS Count 05/22/2025 9     Q Onset 05/22/2025 219     T Offset 05/22/2025 474     QTC Fredericia 05/22/2025 480          ECG:  Sinus rhythm with second-degree AV block Mobitz 1 heart rates 49 bpm QRS duration 86 ms nonspecific ST segment changes    Assessment/plan:  Second-degree type I AV block  The patient is completely asymptomatic and I do  not think he warrants a pacemaker at this time.  I would fit him with a 2-week long-term Holter monitor and assess a TSH as well.  Given his overall good cardiovascular fitness he simply has high vagal tone I do not anticipate that he will require pacemaker unless we see significant pauses on his monitor.  Patient should have no issues with planned radiation therapy for his prostate cancer.    Problem List Items Addressed This Visit       Second degree AV block, Mobitz type I    Relevant Orders    ECG 12 lead (Clinic Performed)      Dl Max MD

## 2025-06-05 ENCOUNTER — OFFICE VISIT (OUTPATIENT)
Facility: CLINIC | Age: 75
End: 2025-06-05
Payer: MEDICARE

## 2025-06-05 ENCOUNTER — ANCILLARY PROCEDURE (OUTPATIENT)
Facility: CLINIC | Age: 75
End: 2025-06-05
Payer: MEDICARE

## 2025-06-05 VITALS — SYSTOLIC BLOOD PRESSURE: 210 MMHG | DIASTOLIC BLOOD PRESSURE: 98 MMHG | HEART RATE: 49 BPM

## 2025-06-05 DIAGNOSIS — I44.1 SECOND DEGREE AV BLOCK, MOBITZ TYPE I: ICD-10-CM

## 2025-06-05 DIAGNOSIS — R93.89 ABNORMAL FINDINGS ON DIAGNOSTIC IMAGING OF OTHER SPECIFIED BODY STRUCTURES: ICD-10-CM

## 2025-06-05 LAB — TSH SERPL-ACNC: 1.5 MIU/L (ref 0.4–4.5)

## 2025-06-05 PROCEDURE — 1036F TOBACCO NON-USER: CPT | Performed by: INTERNAL MEDICINE

## 2025-06-05 PROCEDURE — 1159F MED LIST DOCD IN RCRD: CPT | Performed by: INTERNAL MEDICINE

## 2025-06-05 PROCEDURE — 1126F AMNT PAIN NOTED NONE PRSNT: CPT | Performed by: INTERNAL MEDICINE

## 2025-06-05 PROCEDURE — 99212 OFFICE O/P EST SF 10 MIN: CPT | Mod: 25 | Performed by: INTERNAL MEDICINE

## 2025-06-05 PROCEDURE — 93010 ELECTROCARDIOGRAM REPORT: CPT | Performed by: INTERNAL MEDICINE

## 2025-06-05 PROCEDURE — 93246 EXT ECG>7D<15D RECORDING: CPT

## 2025-06-05 PROCEDURE — 93005 ELECTROCARDIOGRAM TRACING: CPT | Performed by: INTERNAL MEDICINE

## 2025-06-05 PROCEDURE — 3080F DIAST BP >= 90 MM HG: CPT | Performed by: INTERNAL MEDICINE

## 2025-06-05 PROCEDURE — 3077F SYST BP >= 140 MM HG: CPT | Performed by: INTERNAL MEDICINE

## 2025-06-05 PROCEDURE — 99204 OFFICE O/P NEW MOD 45 MIN: CPT | Performed by: INTERNAL MEDICINE

## 2025-06-05 ASSESSMENT — PATIENT HEALTH QUESTIONNAIRE - PHQ9
2. FEELING DOWN, DEPRESSED OR HOPELESS: NOT AT ALL
SUM OF ALL RESPONSES TO PHQ9 QUESTIONS 1 AND 2: 0
1. LITTLE INTEREST OR PLEASURE IN DOING THINGS: NOT AT ALL

## 2025-06-05 ASSESSMENT — ENCOUNTER SYMPTOMS
OCCASIONAL FEELINGS OF UNSTEADINESS: 0
DEPRESSION: 0
LOSS OF SENSATION IN FEET: 0

## 2025-06-05 ASSESSMENT — PAIN SCALES - GENERAL: PAINLEVEL_OUTOF10: 0-NO PAIN

## 2025-06-06 LAB
ATRIAL RATE: 75 BPM
P AXIS: 75 DEGREES
Q ONSET: 220 MS
QRS COUNT: 8 BEATS
QRS DURATION: 86 MS
QT INTERVAL: 518 MS
QTC CALCULATION(BAZETT): 467 MS
QTC FREDERICIA: 484 MS
R AXIS: 49 DEGREES
T AXIS: 17 DEGREES
T OFFSET: 479 MS
VENTRICULAR RATE: 49 BPM

## 2025-06-09 ENCOUNTER — APPOINTMENT (OUTPATIENT)
Dept: RADIATION ONCOLOGY | Facility: CLINIC | Age: 75
End: 2025-06-09
Payer: MEDICARE

## 2025-06-11 ENCOUNTER — APPOINTMENT (OUTPATIENT)
Dept: RADIATION ONCOLOGY | Facility: CLINIC | Age: 75
End: 2025-06-11
Payer: MEDICARE

## 2025-06-17 ENCOUNTER — ANESTHESIA EVENT (OUTPATIENT)
Dept: OPERATING ROOM | Facility: CLINIC | Age: 75
End: 2025-06-17
Payer: MEDICARE

## 2025-06-18 ENCOUNTER — HOSPITAL ENCOUNTER (OUTPATIENT)
Facility: CLINIC | Age: 75
Setting detail: OUTPATIENT SURGERY
Discharge: HOME | End: 2025-06-18
Attending: STUDENT IN AN ORGANIZED HEALTH CARE EDUCATION/TRAINING PROGRAM | Admitting: STUDENT IN AN ORGANIZED HEALTH CARE EDUCATION/TRAINING PROGRAM
Payer: MEDICARE

## 2025-06-18 ENCOUNTER — APPOINTMENT (OUTPATIENT)
Dept: PRIMARY CARE | Facility: CLINIC | Age: 75
End: 2025-06-18
Payer: MEDICARE

## 2025-06-18 ENCOUNTER — ANESTHESIA (OUTPATIENT)
Dept: OPERATING ROOM | Facility: CLINIC | Age: 75
End: 2025-06-18
Payer: MEDICARE

## 2025-06-18 VITALS
TEMPERATURE: 97 F | RESPIRATION RATE: 16 BRPM | BODY MASS INDEX: 24.91 KG/M2 | DIASTOLIC BLOOD PRESSURE: 55 MMHG | HEART RATE: 42 BPM | HEIGHT: 67 IN | OXYGEN SATURATION: 96 % | SYSTOLIC BLOOD PRESSURE: 108 MMHG | WEIGHT: 158.73 LBS

## 2025-06-18 DIAGNOSIS — C61 PROSTATE CANCER (MULTI): ICD-10-CM

## 2025-06-18 PROBLEM — M19.90 OSTEOARTHRITIS: Status: ACTIVE | Noted: 2025-06-18

## 2025-06-18 PROCEDURE — 2500000005 HC RX 250 GENERAL PHARMACY W/O HCPCS: Performed by: STUDENT IN AN ORGANIZED HEALTH CARE EDUCATION/TRAINING PROGRAM

## 2025-06-18 PROCEDURE — 3700000001 HC GENERAL ANESTHESIA TIME - INITIAL BASE CHARGE: Performed by: STUDENT IN AN ORGANIZED HEALTH CARE EDUCATION/TRAINING PROGRAM

## 2025-06-18 PROCEDURE — 2500000004 HC RX 250 GENERAL PHARMACY W/ HCPCS (ALT 636 FOR OP/ED): Mod: JW,TB | Performed by: NURSE ANESTHETIST, CERTIFIED REGISTERED

## 2025-06-18 PROCEDURE — 76872 US TRANSRECTAL: CPT | Performed by: STUDENT IN AN ORGANIZED HEALTH CARE EDUCATION/TRAINING PROGRAM

## 2025-06-18 PROCEDURE — 3600000009 HC OR TIME - EACH INCREMENTAL 1 MINUTE - PROCEDURE LEVEL FOUR: Performed by: STUDENT IN AN ORGANIZED HEALTH CARE EDUCATION/TRAINING PROGRAM

## 2025-06-18 PROCEDURE — 7100000009 HC PHASE TWO TIME - INITIAL BASE CHARGE: Performed by: STUDENT IN AN ORGANIZED HEALTH CARE EDUCATION/TRAINING PROGRAM

## 2025-06-18 PROCEDURE — 7100000010 HC PHASE TWO TIME - EACH INCREMENTAL 1 MINUTE: Performed by: STUDENT IN AN ORGANIZED HEALTH CARE EDUCATION/TRAINING PROGRAM

## 2025-06-18 PROCEDURE — 55876 PLACE RT DEVICE/MARKER PROS: CPT | Performed by: STUDENT IN AN ORGANIZED HEALTH CARE EDUCATION/TRAINING PROGRAM

## 2025-06-18 PROCEDURE — 2780000003 HC OR 278 NO HCPCS: Performed by: STUDENT IN AN ORGANIZED HEALTH CARE EDUCATION/TRAINING PROGRAM

## 2025-06-18 PROCEDURE — 3600000004 HC OR TIME - INITIAL BASE CHARGE - PROCEDURE LEVEL FOUR: Performed by: STUDENT IN AN ORGANIZED HEALTH CARE EDUCATION/TRAINING PROGRAM

## 2025-06-18 PROCEDURE — C1889 IMPLANT/INSERT DEVICE, NOC: HCPCS | Performed by: STUDENT IN AN ORGANIZED HEALTH CARE EDUCATION/TRAINING PROGRAM

## 2025-06-18 PROCEDURE — 3700000002 HC GENERAL ANESTHESIA TIME - EACH INCREMENTAL 1 MINUTE: Performed by: STUDENT IN AN ORGANIZED HEALTH CARE EDUCATION/TRAINING PROGRAM

## 2025-06-18 PROCEDURE — 99100 ANES PT EXTEME AGE<1 YR&>70: CPT | Performed by: ANESTHESIOLOGY

## 2025-06-18 PROCEDURE — 2720000007 HC OR 272 NO HCPCS: Performed by: STUDENT IN AN ORGANIZED HEALTH CARE EDUCATION/TRAINING PROGRAM

## 2025-06-18 PROCEDURE — 55874 TPRNL PLMT BIODEGRDABL MATRL: CPT | Performed by: STUDENT IN AN ORGANIZED HEALTH CARE EDUCATION/TRAINING PROGRAM

## 2025-06-18 PROCEDURE — A55876 PR PLACE RADIOTHER DEVICE/MARKER, PROSTATE: Performed by: NURSE ANESTHETIST, CERTIFIED REGISTERED

## 2025-06-18 PROCEDURE — 2500000004 HC RX 250 GENERAL PHARMACY W/ HCPCS (ALT 636 FOR OP/ED): Performed by: NURSE ANESTHETIST, CERTIFIED REGISTERED

## 2025-06-18 PROCEDURE — A55876 PR PLACE RADIOTHER DEVICE/MARKER, PROSTATE: Performed by: ANESTHESIOLOGY

## 2025-06-18 DEVICE — STERILE PLACEMENT NEEDLES (17GA ETW X 20CM) WITH BONE WAX AND (1.2 X 3MM) SOFT TISSUE GOLD MARKER [3]
Type: IMPLANTABLE DEVICE | Site: PROSTATE | Status: FUNCTIONAL
Brand: FIDUCIAL MARKER KIT

## 2025-06-18 RX ORDER — PROPOFOL 10 MG/ML
INJECTION, EMULSION INTRAVENOUS CONTINUOUS PRN
Status: DISCONTINUED | OUTPATIENT
Start: 2025-06-18 | End: 2025-06-18

## 2025-06-18 RX ORDER — SODIUM CHLORIDE, SODIUM LACTATE, POTASSIUM CHLORIDE, CALCIUM CHLORIDE 600; 310; 30; 20 MG/100ML; MG/100ML; MG/100ML; MG/100ML
50 INJECTION, SOLUTION INTRAVENOUS CONTINUOUS
Status: DISCONTINUED | OUTPATIENT
Start: 2025-06-18 | End: 2025-06-18 | Stop reason: HOSPADM

## 2025-06-18 RX ORDER — CEFAZOLIN SODIUM 2 G/50ML
SOLUTION INTRAVENOUS AS NEEDED
Status: DISCONTINUED | OUTPATIENT
Start: 2025-06-18 | End: 2025-06-18

## 2025-06-18 RX ORDER — FENTANYL CITRATE 50 UG/ML
50 INJECTION, SOLUTION INTRAMUSCULAR; INTRAVENOUS EVERY 5 MIN PRN
Status: DISCONTINUED | OUTPATIENT
Start: 2025-06-18 | End: 2025-06-18 | Stop reason: HOSPADM

## 2025-06-18 RX ORDER — FENTANYL CITRATE 50 UG/ML
INJECTION, SOLUTION INTRAMUSCULAR; INTRAVENOUS AS NEEDED
Status: DISCONTINUED | OUTPATIENT
Start: 2025-06-18 | End: 2025-06-18

## 2025-06-18 RX ORDER — MIDAZOLAM HYDROCHLORIDE 1 MG/ML
INJECTION, SOLUTION INTRAMUSCULAR; INTRAVENOUS AS NEEDED
Status: DISCONTINUED | OUTPATIENT
Start: 2025-06-18 | End: 2025-06-18

## 2025-06-18 RX ORDER — LIDOCAINE HYDROCHLORIDE 10 MG/ML
0.1 INJECTION, SOLUTION EPIDURAL; INFILTRATION; INTRACAUDAL; PERINEURAL ONCE
Status: DISCONTINUED | OUTPATIENT
Start: 2025-06-18 | End: 2025-06-18 | Stop reason: HOSPADM

## 2025-06-18 RX ORDER — FENTANYL CITRATE 50 UG/ML
25 INJECTION, SOLUTION INTRAMUSCULAR; INTRAVENOUS EVERY 5 MIN PRN
Status: DISCONTINUED | OUTPATIENT
Start: 2025-06-18 | End: 2025-06-18 | Stop reason: HOSPADM

## 2025-06-18 RX ORDER — GLYCOPYRROLATE 0.2 MG/ML
INJECTION INTRAMUSCULAR; INTRAVENOUS AS NEEDED
Status: DISCONTINUED | OUTPATIENT
Start: 2025-06-18 | End: 2025-06-18

## 2025-06-18 RX ORDER — SODIUM CHLORIDE 0.9 G/100ML
INJECTION, SOLUTION IRRIGATION AS NEEDED
Status: DISCONTINUED | OUTPATIENT
Start: 2025-06-18 | End: 2025-06-18 | Stop reason: HOSPADM

## 2025-06-18 RX ORDER — METOCLOPRAMIDE HYDROCHLORIDE 5 MG/ML
10 INJECTION INTRAMUSCULAR; INTRAVENOUS ONCE AS NEEDED
Status: DISCONTINUED | OUTPATIENT
Start: 2025-06-18 | End: 2025-06-18 | Stop reason: HOSPADM

## 2025-06-18 RX ORDER — ACETAMINOPHEN 325 MG/1
650 TABLET ORAL EVERY 4 HOURS PRN
Status: DISCONTINUED | OUTPATIENT
Start: 2025-06-18 | End: 2025-06-18 | Stop reason: HOSPADM

## 2025-06-18 RX ORDER — LABETALOL HYDROCHLORIDE 5 MG/ML
5 INJECTION, SOLUTION INTRAVENOUS ONCE AS NEEDED
Status: DISCONTINUED | OUTPATIENT
Start: 2025-06-18 | End: 2025-06-18 | Stop reason: HOSPADM

## 2025-06-18 RX ORDER — KETOROLAC TROMETHAMINE 30 MG/ML
INJECTION, SOLUTION INTRAMUSCULAR; INTRAVENOUS AS NEEDED
Status: DISCONTINUED | OUTPATIENT
Start: 2025-06-18 | End: 2025-06-18

## 2025-06-18 RX ORDER — SODIUM CHLORIDE, SODIUM LACTATE, POTASSIUM CHLORIDE, CALCIUM CHLORIDE 600; 310; 30; 20 MG/100ML; MG/100ML; MG/100ML; MG/100ML
INJECTION, SOLUTION INTRAVENOUS CONTINUOUS PRN
Status: DISCONTINUED | OUTPATIENT
Start: 2025-06-18 | End: 2025-06-18

## 2025-06-18 RX ORDER — SODIUM CHLORIDE 9 MG/ML
INJECTION, SOLUTION INTRAMUSCULAR; INTRAVENOUS; SUBCUTANEOUS AS NEEDED
Status: DISCONTINUED | OUTPATIENT
Start: 2025-06-18 | End: 2025-06-18 | Stop reason: HOSPADM

## 2025-06-18 RX ORDER — OXYCODONE HYDROCHLORIDE 5 MG/1
5 TABLET ORAL EVERY 4 HOURS PRN
Status: DISCONTINUED | OUTPATIENT
Start: 2025-06-18 | End: 2025-06-18 | Stop reason: HOSPADM

## 2025-06-18 RX ORDER — ONDANSETRON HYDROCHLORIDE 2 MG/ML
4 INJECTION, SOLUTION INTRAVENOUS ONCE AS NEEDED
Status: DISCONTINUED | OUTPATIENT
Start: 2025-06-18 | End: 2025-06-18 | Stop reason: HOSPADM

## 2025-06-18 RX ORDER — ONDANSETRON HYDROCHLORIDE 2 MG/ML
INJECTION, SOLUTION INTRAVENOUS AS NEEDED
Status: DISCONTINUED | OUTPATIENT
Start: 2025-06-18 | End: 2025-06-18

## 2025-06-18 RX ORDER — ALBUTEROL SULFATE 0.83 MG/ML
2.5 SOLUTION RESPIRATORY (INHALATION) ONCE AS NEEDED
Status: DISCONTINUED | OUTPATIENT
Start: 2025-06-18 | End: 2025-06-18 | Stop reason: HOSPADM

## 2025-06-18 RX ADMIN — ONDANSETRON 4 MG: 2 INJECTION, SOLUTION INTRAMUSCULAR; INTRAVENOUS at 13:33

## 2025-06-18 RX ADMIN — PROPOFOL 100 MCG/KG/MIN: 10 INJECTION, EMULSION INTRAVENOUS at 13:12

## 2025-06-18 RX ADMIN — GLYCOPYRROLATE 0.2 MG: 0.2 INJECTION, SOLUTION INTRAMUSCULAR; INTRAVENOUS at 13:10

## 2025-06-18 RX ADMIN — KETOROLAC TROMETHAMINE 15 MG: 30 INJECTION, SOLUTION INTRAMUSCULAR; INTRAVENOUS at 13:23

## 2025-06-18 RX ADMIN — SODIUM CHLORIDE, POTASSIUM CHLORIDE, SODIUM LACTATE AND CALCIUM CHLORIDE: 600; 310; 30; 20 INJECTION, SOLUTION INTRAVENOUS at 13:03

## 2025-06-18 RX ADMIN — FENTANYL CITRATE 50 MCG: 50 INJECTION, SOLUTION INTRAMUSCULAR; INTRAVENOUS at 13:12

## 2025-06-18 RX ADMIN — MIDAZOLAM 2 MG: 1 INJECTION INTRAMUSCULAR; INTRAVENOUS at 13:05

## 2025-06-18 RX ADMIN — CEFAZOLIN SODIUM 2 G: 2 SOLUTION INTRAVENOUS at 13:12

## 2025-06-18 RX ADMIN — FENTANYL CITRATE 50 MCG: 50 INJECTION, SOLUTION INTRAMUSCULAR; INTRAVENOUS at 13:18

## 2025-06-18 RX ADMIN — PROPOFOL 20 MG: 10 INJECTION, EMULSION INTRAVENOUS at 13:13

## 2025-06-18 RX ADMIN — GLYCOPYRROLATE 0.2 MG: 0.2 INJECTION, SOLUTION INTRAMUSCULAR; INTRAVENOUS at 13:15

## 2025-06-18 SDOH — HEALTH STABILITY: MENTAL HEALTH: CURRENT SMOKER: 0

## 2025-06-18 ASSESSMENT — COLUMBIA-SUICIDE SEVERITY RATING SCALE - C-SSRS
1. IN THE PAST MONTH, HAVE YOU WISHED YOU WERE DEAD OR WISHED YOU COULD GO TO SLEEP AND NOT WAKE UP?: NO
6. HAVE YOU EVER DONE ANYTHING, STARTED TO DO ANYTHING, OR PREPARED TO DO ANYTHING TO END YOUR LIFE?: NO
2. HAVE YOU ACTUALLY HAD ANY THOUGHTS OF KILLING YOURSELF?: NO

## 2025-06-18 ASSESSMENT — PAIN SCALES - GENERAL
PAINLEVEL_OUTOF10: 0 - NO PAIN
PAIN_LEVEL: 0

## 2025-06-18 ASSESSMENT — PAIN - FUNCTIONAL ASSESSMENT
PAIN_FUNCTIONAL_ASSESSMENT: 0-10

## 2025-06-18 NOTE — ANESTHESIA PREPROCEDURE EVALUATION
"Patient: Giacomo Garay \"Kwaku\"    Procedure Information       Date/Time: 06/18/25 1333    Procedure: Insertion Fiducial Marker Prostate (Prostate)    Location: [facility] [place] [facility] OR 02 / [facility] [place] [facility]    Surgeons: Robbie Smyth MD            Relevant Problems   Anesthesia (within normal limits)      Cardiac   (+) Essential hypertension   (+) Hyperlipidemia   (+) Preclinical coronary artery disease   (+) Second degree AV block, Mobitz type I      Pulmonary (within normal limits)      Neuro   (+) Acoustic neuroma (Multi)      GI (within normal limits)      /Renal   (+) Prostate cancer (Multi)      Liver (within normal limits)      Endocrine (within normal limits)      Hematology (within normal limits)      Musculoskeletal   (+) Osteoarthritis      HEENT   (+) Asymmetrical sensorineural hearing loss       Clinical information reviewed:   Tobacco  Allergies  Meds   Med Hx  Surg Hx   Fam Hx  Soc Hx        NPO Detail:  NPO/Void Status  Date of Last Liquid: 06/17/25  Time of Last Liquid: 1800  Date of Last Solid: 06/17/25  Time of Last Solid: 1800  Last Intake Type: Clear fluids; Food  Time of Last Void: 1000         Physical Exam    Airway  Mallampati: II  TM distance: >3 FB  Mouth opening: 3 or more finger widths     Cardiovascular - normal exam   Dental - normal exam     Pulmonary - normal exam   Abdominal - normal exam           Anesthesia Plan    History of general anesthesia?: yes  History of complications of general anesthesia?: no    ASA 3     MAC     The patient is not a current smoker.    intravenous induction   Postoperative pain plan includes opioids.  Anesthetic plan and risks discussed with patient.    Plan discussed with CRNA and CAA.      "

## 2025-06-18 NOTE — H&P
"History Of Present Illness  Giacomo Garay \"Kwaku\" is a 74 y.o. male presenting with prostate cancer.     Past Medical History  Medical History[1]    Surgical History  Surgical History[2]     Social History  He reports that he has never smoked. He has never used smokeless tobacco. He reports current alcohol use of about 1.0 standard drink of alcohol per week. He reports that he does not use drugs.    Family History  Family History[3]     Allergies  Patient has no known allergies.    Review of Systems     Physical Exam     Last Recorded Vitals  Blood pressure (!) 225/107, pulse 62, temperature 36.4 °C (97.5 °F), temperature source Tympanic, resp. rate 16, height 1.702 m (5' 7\"), weight 72 kg (158 lb 11.7 oz), SpO2 100%.    Relevant Results             Assessment & Plan  Prostate cancer (Multi)    Osteoarthritis      Spacer and fiducials    I spent  minutes in the professional and overall care of this patient.      Robbie Smyth MD         [1]   Past Medical History:  Diagnosis Date    Acoustic neuroma (Multi)     Arrhythmia     Bradycardia and Second degree heart block, Mobitz 1    Arthritis     BPH (benign prostatic hyperplasia)     Cataract     Dislocated shoulder     multiple times on the right    Encounter for general adult medical examination without abnormal findings 05/03/2021    Medicare annual wellness visit, initial    Essential (primary) hypertension 06/08/2020    White coat syndrome with hypertension    Hearing aid worn     HL (hearing loss)     Holosystolic murmur     Hyperlipidemia     Impacted cerumen, bilateral 03/28/2019    Excessive cerumen in both ear canals    Irregular heart beat     Joint pain     Lumbar disc disease     Other specified health status     No pertinent past medical history    Personal history of colonic polyps 03/18/2019    History of colonic polyps    Personal history of other diseases of the musculoskeletal system and connective tissue 01/13/2020    History of dislocation of " shoulder    Personal history of other diseases of the musculoskeletal system and connective tissue 01/08/2020    History of dislocation of shoulder    Prostate cancer (Multi)     Strain of unspecified muscle(s) and tendon(s) at lower leg level, left leg, initial encounter 06/17/2020    Strain of left knee, initial encounter    Syncope    [2]   Past Surgical History:  Procedure Laterality Date    COLONOSCOPY      PROSTATE BIOPSY      SHOULDER SURGERY Right     Multiple dislocations, re-set under anesthesia   [3]   Family History  Problem Relation Name Age of Onset    Cancer Mother Blair Garay     Other (cardiac disorder) Father Giacomo Garay     Diabetes Father Giacomo Garay     Heart disease Father Giacomo Garay     Other (cabg) Father Giacomo Garay     Diabetes Brother

## 2025-06-18 NOTE — ANESTHESIA POSTPROCEDURE EVALUATION
"Patient: Giacomo Garay \"Kwaku\"    Procedure Summary       Date: 06/18/25 [place] [facility] [place] [facility] [place] [facility] [place] [facility]    Anesthesia Start: 1303 Anesthesia Stop: 1341    Procedure: Insertion Fiducial Marker Prostate (Prostate) Diagnosis:       Prostate cancer (Multi)      (Prostate cancer (Multi) [C61])    Surgeons: Robbie Smyth MD Responsible Provider: Zainab Hurst MD    Anesthesia Type: MAC ASA Status: 3            Anesthesia Type: MAC    Vitals Value Taken Time   /55 06/18/25 13:41   Temp 36.1 06/18/25 13:41   Pulse 42 06/18/25 13:41   Resp 16 06/18/25 13:41   SpO2 99 06/18/25 13:41       Anesthesia Post Evaluation    Patient location during evaluation: PACU  Patient participation: complete - patient participated  Level of consciousness: awake and alert  Pain score: 0  Pain management: adequate  Multimodal analgesia pain management approach  Airway patency: patent  Two or more strategies used to mitigate risk of obstructive sleep apnea  Cardiovascular status: acceptable and stable  Respiratory status: acceptable and face mask  Hydration status: acceptable  Postoperative Nausea and Vomiting: none        There were no known notable events for this encounter.    "

## 2025-06-18 NOTE — DISCHARGE INSTRUCTIONS
Dr. Smyth - [facility]  Phone: [phone]    Follow-Up Information  For patients receiving spaceoar and Fiducial Markers, please follow up with Radiology Oncology as scheduled for Planning.    Medication  Please take the medications as prescribed by your doctor. Tylenol or non-steroids! anti-inflammatory medications (such as Aleve®) should relieve mild pain and discomfort. Resume the usual medications you took before surgery unless instructed otherwise.    Resuming Activities and Driving  *NO Driving on the day of surgery  *You may resume driving the day after surgery  *You may resume normal activities as tolerated  *You may shower     Diet  You may resume your normal diet once at home, with no additional considerations.    Expected Signs and Symptoms  You may have a small amount of bleeding with urination on occasion. This may be accompanied with small blood clots. This is normal and should be relieved by increasing your fluid intake.  You may experience some mild burning and discomfort during urination. This is normal and should subside in one to two weeks.      When to Call Your Doctor - Dr. Smyth [phone]  Please call the office immediately if any of the following symptoms appear:    *Inability to eat, drink, or take medication  *Persistent Nausea or Vomiting  *Fever over 100.4 degrees F. (38 degrees C.)    Please call 9-1-1 if you experience any of the following:  *Chest Pain, Shortness of Breath or Difficulty Breathing  *Sudden one sided weakness/slurred speech/ any signs or symptoms of a stroke  *Severe headache or visual disturbance    Additional Home-Going Instructions for Adults Who Have Had Anesthesia or Sedatives:    The anesthetics, sedatives and pain killers which were given to you will be acting in your body for the next 24 hours.  This may cause you to feel sleepy.  This feeling will slowly wear off.    For the next 24 hours you SHOULD NOT:  *Drive a car, or operate machinery or power  tools  *Drink any form of alcohol (including beer or wine)  *Make any important Decisions

## 2025-06-19 ASSESSMENT — PAIN SCALES - GENERAL: PAINLEVEL_OUTOF10: 0 - NO PAIN

## 2025-06-21 NOTE — OP NOTE
"Insertion Fiducial Marker Prostate Operative Note     Date: 2025  [place] [facility] [place] [facility] [place]    Name: Giacomo Garay \"Kwaku\", : 1950, Age: 74 y.o., MRN: [id number], Sex: male    Diagnosis  Pre-op Diagnosis      * Prostate cancer (Multi) [C61] Post-op Diagnosis     * Prostate cancer (Multi) [C61]     Procedures  Insertion Fiducial Marker Prostate  29704 - [place] PLMT INTERSTITIAL DEV RADIAT [place] PROSTATE 1/MULT    [place] TRANSPERINEAL PLMT BIODEGRADABLE MATRL 1/MLT NJX [69354]  Surgeons      * Robbie Smyth - Primary    Resident/Fellow/Other Assistant:  Surgeons and Role:  * No surgeons found with a matching role *    Staff:   Circulator: Lucero Perez Person: Guerita    Anesthesia Staff: Anesthesiologist: Zainab Hurst MD  CRNA: MICHAEL Gilliland-CRNA         Preoperative Diagnosis  Prostate cancer.       Postoperative Diagnosis  Same.       Procedure Performed  SpaceOAR and Fiducial placement, Transrectal ultrasound .   Surgeon  Robbie Smyth MD ([id number]) - Urology.   Assisted by  N/A.      Details of Procedure     Anesthesia  MAC.   Estimated Blood Loss (ml)  5.   Specimen(s) Removed  None.   Drain(s)  None.   Implant(s)  None.   Complications  None.   Indications (History)  Prostate cancer.   Findings of Procedure  47g heterogenous.   Description of Procedure  After administration of anesthesia and antibiotics, the patient was placed in the dorsal lithotomy position and prepped and draped in the usual sterile fashion. A prostate block was performed and transrectal ultrasound using a stepper. The prostate was measured. There were some hypoechoic areas. Fiducials were placed at the base, apex and mid prostate. The perirectal fat was identified using the finder needle with excellent dispersion of saline at the mid prostate. SpaceOAR Blayne was then placed in this space. Excellent placement was confirmed in both the transverse and saggital planes. There was no puncture of the rectal wall during the " procedure. .

## 2025-06-24 ENCOUNTER — TELEPHONE (OUTPATIENT)
Dept: RADIATION ONCOLOGY | Facility: CLINIC | Age: 75
End: 2025-06-24
Payer: MEDICARE

## 2025-06-24 NOTE — TELEPHONE ENCOUNTER
Spoke with patient. Reminder given for arrival time of CT simulation tomorrow and instruction to have full bladder and empty rectum. Verbalized understanding, call ended.

## 2025-06-25 ENCOUNTER — PREP FOR PROCEDURE (OUTPATIENT)
Dept: RADIATION ONCOLOGY | Facility: CLINIC | Age: 75
End: 2025-06-25

## 2025-06-25 ENCOUNTER — HOSPITAL ENCOUNTER (OUTPATIENT)
Dept: RADIATION ONCOLOGY | Facility: CLINIC | Age: 75
Setting detail: RADIATION/ONCOLOGY SERIES
Discharge: HOME | End: 2025-06-25
Payer: MEDICARE

## 2025-06-25 ENCOUNTER — HOSPITAL ENCOUNTER (OUTPATIENT)
Dept: RADIOLOGY | Facility: EXTERNAL LOCATION | Age: 75
Discharge: HOME | End: 2025-06-25

## 2025-06-25 VITALS
BODY MASS INDEX: 25.36 KG/M2 | OXYGEN SATURATION: 99 % | WEIGHT: 161.93 LBS | DIASTOLIC BLOOD PRESSURE: 83 MMHG | HEART RATE: 56 BPM | TEMPERATURE: 97.9 F | SYSTOLIC BLOOD PRESSURE: 211 MMHG | RESPIRATION RATE: 18 BRPM

## 2025-06-25 DIAGNOSIS — C61 PROSTATE CANCER (MULTI): ICD-10-CM

## 2025-06-25 PROCEDURE — 77290 THER RAD SIMULAJ FIELD CPLX: CPT | Performed by: RADIOLOGY

## 2025-06-25 PROCEDURE — 77334 RADIATION TREATMENT AID(S): CPT | Performed by: RADIOLOGY

## 2025-06-25 ASSESSMENT — PAIN SCALES - GENERAL: PAINLEVEL_OUTOF10: 0-NO PAIN

## 2025-06-27 ENCOUNTER — TELEPHONE (OUTPATIENT)
Facility: CLINIC | Age: 75
End: 2025-06-27
Payer: MEDICARE

## 2025-06-27 ENCOUNTER — HOSPITAL ENCOUNTER (OUTPATIENT)
Dept: RADIATION ONCOLOGY | Facility: CLINIC | Age: 75
Setting detail: RADIATION/ONCOLOGY SERIES
Discharge: HOME | End: 2025-06-27
Payer: MEDICARE

## 2025-06-27 PROCEDURE — 77295 3-D RADIOTHERAPY PLAN: CPT | Performed by: RADIOLOGY

## 2025-06-27 PROCEDURE — 77300 RADIATION THERAPY DOSE PLAN: CPT | Performed by: RADIOLOGY

## 2025-06-27 PROCEDURE — 77370 RADIATION PHYSICS CONSULT: CPT | Mod: 59 | Performed by: RADIOLOGY

## 2025-06-27 PROCEDURE — 77334 RADIATION TREATMENT AID(S): CPT | Performed by: RADIOLOGY

## 2025-06-27 NOTE — TELEPHONE ENCOUNTER
VM received from Klarissa from [employer] in regard to abnormal ZIO results. Returned call with ref number [id number]. Spoke with Ashley and reported patient wearing monitor from 5-12 of Jun- 3134 complete heart block lasting 1 day and 7 hours of 8 day wear. 3567 pauses, longest 4.5 seconds. I report of V-tach. Report available online.

## 2025-07-01 ENCOUNTER — DOCUMENTATION (OUTPATIENT)
Dept: CARDIOLOGY | Facility: HOSPITAL | Age: 75
End: 2025-07-01
Payer: MEDICARE

## 2025-07-01 ENCOUNTER — TELEPHONE (OUTPATIENT)
Facility: CLINIC | Age: 75
End: 2025-07-01
Payer: MEDICARE

## 2025-07-01 DIAGNOSIS — I44.2 COMPLETE HEART BLOCK: Primary | ICD-10-CM

## 2025-07-01 NOTE — PROGRESS NOTES
I called the patient and we had a phone conversation regarding the results of his 2-week long-term monitor which showed episodes of complete atrioventricular block that lasted for over a day.  His heart rates drop in the 20s and he has associated lightheadedness but no jatin syncope.  Remarkably he is able to continue to workout very aggressively on his Peloton bicycle.  He is amenable to pacemaker implantation at this time and we will schedule in the coming weeks.

## 2025-07-01 NOTE — TELEPHONE ENCOUNTER
Attempted to contact the patient regarding scheduling a pacemaker per Dr. Max. Voicemail box is full. Will attempt to reach the patient again later.

## 2025-07-07 ENCOUNTER — HOSPITAL ENCOUNTER (OUTPATIENT)
Dept: RADIATION ONCOLOGY | Facility: CLINIC | Age: 75
Setting detail: RADIATION/ONCOLOGY SERIES
Discharge: HOME | End: 2025-07-07
Payer: MEDICARE

## 2025-07-07 DIAGNOSIS — Z51.0 ENCOUNTER FOR ANTINEOPLASTIC RADIATION THERAPY: ICD-10-CM

## 2025-07-07 DIAGNOSIS — C61 MALIGNANT NEOPLASM OF PROSTATE (MULTI): ICD-10-CM

## 2025-07-07 LAB
RAD ONC MSQ ACTUAL FRACTIONS DELIVERED: 1
RAD ONC MSQ ACTUAL SESSION DELIVERED DOSE: 750 CGRAY
RAD ONC MSQ ACTUAL TOTAL DOSE: 750 CGRAY
RAD ONC MSQ ELAPSED DAYS: 0
RAD ONC MSQ LAST DATE: NORMAL
RAD ONC MSQ PRESCRIBED FRACTIONAL DOSE: 750 CGRAY
RAD ONC MSQ PRESCRIBED NUMBER OF FRACTIONS: 5
RAD ONC MSQ PRESCRIBED TECHNIQUE: NORMAL
RAD ONC MSQ PRESCRIBED TOTAL DOSE: 3750 CGRAY
RAD ONC MSQ PRESCRIPTION PATTERN COMMENT: NORMAL
RAD ONC MSQ START DATE: NORMAL
RAD ONC MSQ TREATMENT COURSE NUMBER: 1
RAD ONC MSQ TREATMENT SITE: NORMAL

## 2025-07-07 PROCEDURE — 77373 STRTCTC BDY RAD THER TX DLVR: CPT | Performed by: RADIOLOGY

## 2025-07-07 PROCEDURE — 77280 THER RAD SIMULAJ FIELD SMPL: CPT | Performed by: RADIOLOGY

## 2025-07-08 ENCOUNTER — TELEPHONE (OUTPATIENT)
Facility: CLINIC | Age: 75
End: 2025-07-08
Payer: MEDICARE

## 2025-07-08 DIAGNOSIS — I44.1 SECOND DEGREE AV BLOCK, MOBITZ TYPE I: ICD-10-CM

## 2025-07-08 DIAGNOSIS — I44.2 COMPLETE HEART BLOCK: Primary | ICD-10-CM

## 2025-07-08 DIAGNOSIS — I10 ESSENTIAL HYPERTENSION: ICD-10-CM

## 2025-07-09 ENCOUNTER — HOSPITAL ENCOUNTER (OUTPATIENT)
Dept: RADIATION ONCOLOGY | Facility: CLINIC | Age: 75
Setting detail: RADIATION/ONCOLOGY SERIES
Discharge: HOME | End: 2025-07-09
Payer: MEDICARE

## 2025-07-09 DIAGNOSIS — C61 PROSTATE CANCER (MULTI): ICD-10-CM

## 2025-07-09 DIAGNOSIS — Z51.0 ENCOUNTER FOR ANTINEOPLASTIC RADIATION THERAPY: ICD-10-CM

## 2025-07-09 DIAGNOSIS — C61 MALIGNANT NEOPLASM OF PROSTATE (MULTI): ICD-10-CM

## 2025-07-09 LAB
RAD ONC MSQ ACTUAL FRACTIONS DELIVERED: 2
RAD ONC MSQ ACTUAL SESSION DELIVERED DOSE: 750 CGRAY
RAD ONC MSQ ACTUAL TOTAL DOSE: 1500 CGRAY
RAD ONC MSQ ELAPSED DAYS: 2
RAD ONC MSQ LAST DATE: NORMAL
RAD ONC MSQ PRESCRIBED FRACTIONAL DOSE: 750 CGRAY
RAD ONC MSQ PRESCRIBED NUMBER OF FRACTIONS: 5
RAD ONC MSQ PRESCRIBED TECHNIQUE: NORMAL
RAD ONC MSQ PRESCRIBED TOTAL DOSE: 3750 CGRAY
RAD ONC MSQ PRESCRIPTION PATTERN COMMENT: NORMAL
RAD ONC MSQ START DATE: NORMAL
RAD ONC MSQ TREATMENT COURSE NUMBER: 1
RAD ONC MSQ TREATMENT SITE: NORMAL

## 2025-07-09 PROCEDURE — 77373 STRTCTC BDY RAD THER TX DLVR: CPT | Performed by: RADIOLOGY

## 2025-07-10 LAB — BODY SURFACE AREA: 1.86 M2

## 2025-07-11 ENCOUNTER — HOSPITAL ENCOUNTER (OUTPATIENT)
Dept: RADIATION ONCOLOGY | Facility: CLINIC | Age: 75
Setting detail: RADIATION/ONCOLOGY SERIES
Discharge: HOME | End: 2025-07-11
Payer: MEDICARE

## 2025-07-11 DIAGNOSIS — C61 MALIGNANT NEOPLASM OF PROSTATE (MULTI): ICD-10-CM

## 2025-07-11 DIAGNOSIS — Z51.0 ENCOUNTER FOR ANTINEOPLASTIC RADIATION THERAPY: ICD-10-CM

## 2025-07-11 LAB
RAD ONC MSQ ACTUAL FRACTIONS DELIVERED: 3
RAD ONC MSQ ACTUAL SESSION DELIVERED DOSE: 750 CGRAY
RAD ONC MSQ ACTUAL TOTAL DOSE: 2250 CGRAY
RAD ONC MSQ ELAPSED DAYS: 4
RAD ONC MSQ LAST DATE: NORMAL
RAD ONC MSQ PRESCRIBED FRACTIONAL DOSE: 750 CGRAY
RAD ONC MSQ PRESCRIBED NUMBER OF FRACTIONS: 5
RAD ONC MSQ PRESCRIBED TECHNIQUE: NORMAL
RAD ONC MSQ PRESCRIBED TOTAL DOSE: 3750 CGRAY
RAD ONC MSQ PRESCRIPTION PATTERN COMMENT: NORMAL
RAD ONC MSQ START DATE: NORMAL
RAD ONC MSQ TREATMENT COURSE NUMBER: 1
RAD ONC MSQ TREATMENT SITE: NORMAL

## 2025-07-11 PROCEDURE — 77373 STRTCTC BDY RAD THER TX DLVR: CPT | Performed by: STUDENT IN AN ORGANIZED HEALTH CARE EDUCATION/TRAINING PROGRAM

## 2025-07-14 ENCOUNTER — HOSPITAL ENCOUNTER (OUTPATIENT)
Dept: RADIATION ONCOLOGY | Facility: CLINIC | Age: 75
Setting detail: RADIATION/ONCOLOGY SERIES
Discharge: HOME | End: 2025-07-14
Payer: MEDICARE

## 2025-07-14 VITALS
HEART RATE: 80 BPM | OXYGEN SATURATION: 99 % | SYSTOLIC BLOOD PRESSURE: 209 MMHG | DIASTOLIC BLOOD PRESSURE: 116 MMHG | BODY MASS INDEX: 24.52 KG/M2 | WEIGHT: 156.53 LBS | RESPIRATION RATE: 18 BRPM | TEMPERATURE: 97.3 F

## 2025-07-14 DIAGNOSIS — C61 MALIGNANT NEOPLASM OF PROSTATE (MULTI): ICD-10-CM

## 2025-07-14 DIAGNOSIS — R39.9 LOWER URINARY TRACT SYMPTOMS (LUTS): Primary | ICD-10-CM

## 2025-07-14 DIAGNOSIS — Z51.0 ENCOUNTER FOR ANTINEOPLASTIC RADIATION THERAPY: ICD-10-CM

## 2025-07-14 LAB
RAD ONC MSQ ACTUAL FRACTIONS DELIVERED: 4
RAD ONC MSQ ACTUAL SESSION DELIVERED DOSE: 750 CGRAY
RAD ONC MSQ ACTUAL TOTAL DOSE: 3000 CGRAY
RAD ONC MSQ ELAPSED DAYS: 7
RAD ONC MSQ LAST DATE: NORMAL
RAD ONC MSQ PRESCRIBED FRACTIONAL DOSE: 750 CGRAY
RAD ONC MSQ PRESCRIBED NUMBER OF FRACTIONS: 5
RAD ONC MSQ PRESCRIBED TECHNIQUE: NORMAL
RAD ONC MSQ PRESCRIBED TOTAL DOSE: 3750 CGRAY
RAD ONC MSQ PRESCRIPTION PATTERN COMMENT: NORMAL
RAD ONC MSQ START DATE: NORMAL
RAD ONC MSQ TREATMENT COURSE NUMBER: 1
RAD ONC MSQ TREATMENT SITE: NORMAL

## 2025-07-14 PROCEDURE — 77373 STRTCTC BDY RAD THER TX DLVR: CPT | Performed by: RADIOLOGY

## 2025-07-14 RX ORDER — TAMSULOSIN HYDROCHLORIDE 0.4 MG/1
0.4 CAPSULE ORAL DAILY
Qty: 30 CAPSULE | Refills: 2 | Status: SHIPPED | OUTPATIENT
Start: 2025-07-14

## 2025-07-14 ASSESSMENT — PAIN SCALES - GENERAL: PAINLEVEL_OUTOF10: 0-NO PAIN

## 2025-07-14 NOTE — H&P
"History Of Present Illness  Giacomo Garay \"Kwaku\" is a 74 y.o. male presenting with complete heart block. Here for dual chamber PPM implant. Pt referred to Dr. Max for second degree heart block Mobitz type 1. Work up which included a 2 week long monitor showed episodes of complete atrioventricular block that lasted for over a day. His heart rate drop into the 20's with associated lightheadedness but no syncope. PMH includes prostate cancer, HLD, HTN.       Holter Monitor  Interpretation Summary    Patient had a min HR of 21 bpm, max HR of 156 bpm, and avg HR of 35 bpm.  Predominant underlying rhythm was Sinus Rhythm. First Degree AV Block was  present. 1 run of Ventricular Tachycardia occurred lasting 12.0 secs with a max rate  of 156 bpm (avg 137 bpm). 3567 Pauses occurred, the longest lasting 4.5 secs (14  bpm). Some Pauses occurred due to Second Degree AV Block-Mobitz I  (Wenckebach). 3134 episode(s) of AV Block (3rd°) occurred, lasting a total of 1 day  7 hours. Second Degree AV Block-Mobitz I (Wenckebach) was present. No Isolated  SVEs, SVE Couplets, or SVE Triplets were present. Isolated VEs were rare (<1.0%),  and no VE Couplets or VE Triplets were present. MD notification criteria for  Complete Heart Block and Pauses met - report posted prior to notification (SG).      Past Medical History:  Medical History[1]     Past Surgical History:  Surgical History[2]       Social History:  Social History[3]    Family History:  Family History[4]     Allergies:  RX Allergies[5]     Home Medications:  Current Outpatient Medications   Medication Instructions    glucosamine-chondroitin 500-400 mg tablet 1 tablet    multivitamin tablet 1 tablet, Daily    rosuvastatin (CRESTOR) 10 mg, oral, Daily    tamsulosin (FLOMAX) 0.4 mg, oral, Daily, Do not crush, chew, or split.    TURMERIC ORAL Take by mouth.    valsartan (DIOVAN) 160 mg, oral, Daily       Inpatient Medications:  Scheduled Medications[6]  PRN " Medications[7]  Continuous Medications[8]      Review of Systems   Constitutional: Negative.    HENT: Negative.     Eyes: Negative.    Respiratory: Negative.     Cardiovascular: Negative.    Gastrointestinal: Negative.    Endocrine: Negative.    Genitourinary: Negative.    Musculoskeletal: Negative.    Skin: Negative.    Allergic/Immunologic: Negative.    Neurological: Negative.    Hematological: Negative.    Psychiatric/Behavioral: Negative.            Physical Exam  Constitutional:       General: He is awake. He is not in acute distress.     Appearance: Normal appearance. He is not ill-appearing.   HENT:      Head: Normocephalic and atraumatic.      Mouth/Throat:      Mouth: Mucous membranes are moist.      Pharynx: Oropharynx is clear.     Cardiovascular:      Rate and Rhythm: Regular rhythm. Bradycardia present.      Pulses:           Radial pulses are 2+ on the right side and 2+ on the left side.        Dorsalis pedis pulses are 2+ on the right side and 2+ on the left side.      Heart sounds: No murmur heard.  Pulmonary:      Effort: Pulmonary effort is normal.      Breath sounds: Normal breath sounds.   Abdominal:      General: Bowel sounds are normal.      Palpations: Abdomen is soft.     Musculoskeletal:         General: Normal range of motion.      Right lower leg: No edema.      Left lower leg: No edema.     Skin:     General: Skin is warm and dry.      Capillary Refill: Capillary refill takes less than 2 seconds.     Neurological:      General: No focal deficit present.      Mental Status: He is alert and oriented to person, place, and time. Mental status is at baseline.      Cranial Nerves: Cranial nerves 2-12 are intact.     Psychiatric:         Mood and Affect: Mood and affect normal.         Behavior: Behavior normal.        Sedation Plan    ASA 4     Mallampati class: II.           NPO since 1900 on 7/20/2025    Last Recorded Vitals  Blood pressure (!) 194/105, pulse 64, temperature 36.8 °C (98.3  "°F), temperature source Temporal, resp. rate 16, height 1.702 m (5' 7\"), weight 70.8 kg (156 lb), SpO2 100%.         Vitals from the Past 24 Hours  Heart Rate:  [64]   Temp:  [36.8 °C (98.3 °F)]   Resp:  [16]   BP: (194)/(105)   Height:  [170.2 cm (5' 7\")]   Weight:  [70.8 kg (156 lb)]   SpO2:  [100 %]          Relevant Results    Labs    POCT Glucose:      POCT Urine Pregnancy:       CBC:   Recent Labs     07/17/25  0746 05/22/25  0938 07/11/24  1112 05/05/22  0806 05/04/21  1005 06/08/20  0827   WBC 6.1 9.9 7.9 7.0 6.3 6.2   HGB 16.0 15.9 15.7 14.8 15.1 14.8   HCT 50.7* 50.2 48.8 46.9 48.3 47.3    259 235 213 201 209   MCV 88.5 85 88 89 92 87     BMP/CMP:   Recent Labs     07/17/25  0746 05/22/25  0938 07/11/24  1112 05/05/22  0806 11/03/21  0948 05/04/21  1005 06/08/20  0827    138 138 137 140 140 139   K 5.1 5.0 4.7 4.1 4.8 5.5* 4.5    98 103 100 103 103 102   BUN 19 13 21 22 17 22 26*   CREATININE 1.17 1.22 1.41* 1.24 1.19 1.15 1.10   CO2 27 27 29 27 30 29 28   CALCIUM 9.8 10.2 9.5 9.3 9.6 10.0 9.5   PROT 7.3  --  6.9 7.3 7.0 7.1 6.9   BILITOT 0.8  --  1.0 0.9 0.8 0.8 0.8   ALKPHOS 46  --  38 43 62 63 45   ALT 22  --  22 28 32 57* 23   AST 24  --  26 33 31 43* 25   GLUCOSE 116* 140* 85 107* 94 123* 116*      Magnesium: No results for input(s): \"MG\" in the last 66163 hours.  Lipid Panel:   Recent Labs     07/17/25  0746 07/11/24  1112 05/05/22  0806 11/03/21  0948 05/04/21  1005 06/08/20  0827 03/22/19  1200   CHOL 202* 202* 190 203* 203* 178 255*   HDL 67 62.2 67.0 76.6 72.0 63.9 81.3   CHHDL 3.0 3.2 2.8 2.7 2.8 2.8 3.1   VLDL  --  18 13 17 19 17 14   TRIG 91 92 65 86 97 84 72   NHDL 135* 140  --   --   --   --   --      Cardiac       No lab exists for component: \"CK\", \"CKMBP\"   Hemoglobin A1C:   Recent Labs     07/17/25  0746 05/05/22  0806 11/03/21  0948 05/04/21  1005 06/08/20  0827   HGBA1C 6.2* 6.0* 6.3* 6.0 6.1     TSH/ Free T4:   Recent Labs     06/05/25  1105   TSH 1.50     Iron: No " "results for input(s): \"FERRITIN\", \"TIBC\", \"IRONSAT\", \"BNP\" in the last 78005 hours.  Coag:     ABO: No results found for: \"ABO\"    Past Cardiology Tests (Last 3 Years):    EKG:  Recent Labs     06/05/25  0915 05/22/25  0815   ATRRATE 75 71   VENTRATE 49 50   QRSDUR 86 92   QTCFRED 484 480   QTCCALCB 467 464     Encounter Date: 06/05/25   ECG 12 lead (Clinic Performed)   Result Value    Ventricular Rate 49    Atrial Rate 75    QRS Duration 86    QT Interval 518    QTC Calculation(Bazett) 467    P Axis 75    R Axis 49    T Axis 17    QRS Count 8    Q Onset 220    T Offset 479    QTC Fredericia 484    Narrative    Sinus rhythm with 2nd degree AV block (Mobitz I)  Nonspecific ST and T wave abnormality  Prolonged QT  Abnormal ECG    Confirmed by Dl Max ([id number]) on 6/6/2025 9:13:24 AM     Echo:  Echocardiogram: No results found for this or any previous visit from the past 1800 days.    Ejection Fractions:  LV EF   Date/Time Value Ref Range Status   07/17/2025 07:24 AM 60 %      Cath:  Coronary Angiography: No results found for this or any previous visit from the past 1800 days.    Right Heart Cath: No results found for this or any previous visit from the past 1800 days.    Stress Test:  Nuclear:No results found for this or any previous visit from the past 1800 days.    Metabolic Stress: No results found for this or any previous visit from the past 1800 days.    Cardiac Imaging:  Cardiac Scoring: No results found for this or any previous visit from the past 1800 days.    Cardiac MRI: No results found for this or any previous visit from the past 1800 days.         Assessment/Plan  Assessment/Plan   Assessment & Plan  Complete heart block    Complete heart block    Here for dual chamber PPM implant with Dr. Max on 7/21/2025.              NP discussed with Dr. Max regarding plan of care/ discharge plan      I spent 35 minutes in the professional and overall care of this patient.      Brianda Bridges, " APRN-CNP         [1]   Past Medical History:  Diagnosis Date    Acoustic neuroma (Multi)     Arrhythmia     Bradycardia and Second degree heart block, Mobitz 1    Arthritis     BPH (benign prostatic hyperplasia)     Cataract     Dislocated shoulder     multiple times on the right    Encounter for general adult medical examination without abnormal findings 05/03/2021    Medicare annual wellness visit, initial    Essential (primary) hypertension 06/08/2020    White coat syndrome with hypertension    Hearing aid worn     HL (hearing loss)     Holosystolic murmur     Hyperlipidemia     Impacted cerumen, bilateral 03/28/2019    Excessive cerumen in both ear canals    Irregular heart beat     Joint pain     Lumbar disc disease     Other specified health status     No pertinent past medical history    Personal history of colonic polyps 03/18/2019    History of colonic polyps    Personal history of other diseases of the musculoskeletal system and connective tissue 01/13/2020    History of dislocation of shoulder    Personal history of other diseases of the musculoskeletal system and connective tissue 01/08/2020    History of dislocation of shoulder    Prostate cancer (Multi)     Strain of unspecified muscle(s) and tendon(s) at lower leg level, left leg, initial encounter 06/17/2020    Strain of left knee, initial encounter    Syncope    [2]   Past Surgical History:  Procedure Laterality Date    COLONOSCOPY      PROSTATE BIOPSY      SHOULDER SURGERY Right     Multiple dislocations, re-set under anesthesia   [3]   Social History  Tobacco Use    Smoking status: Never    Smokeless tobacco: Never    Tobacco comments:     Pt denies any illness in past month     Denies personal/family reaction or problems with anesthesia     Denies SOB with stairs or daily activity     Ambulates freely   Vaping Use    Vaping status: Never Used   Substance Use Topics    Alcohol use: Yes     Alcohol/week: 1.0 standard drink of alcohol     Types: 1  Glasses of wine per week    Drug use: Never   [4]   Family History  Problem Relation Name Age of Onset    Cancer Mother Blair Garay     Other (cardiac disorder) Father Giacomo Garay     Diabetes Father Giacomo Garay     Heart disease Father Giacomo Garay     Other (cabg) Father Giacomo Garay     Diabetes Brother     [5] No Known Allergies  [6]   Scheduled medications   Medication Dose Route Frequency    ceFAZolin  1 g intravenous Once   [7]   PRN medications   Medication   [8]   Continuous Medications   Medication Dose Last Rate

## 2025-07-14 NOTE — PROGRESS NOTES
Radiation Oncology On Treatment Visit    Patient Name:  Giacomo Garay  MRN:  [id number]  :  1950    Referring Provider: Sebas Espinoza MD  Primary Care Provider: Evert Steward MD  Care Team: Patient Care Team:  Evert Steward MD as PCP - General (Family Medicine)  Moo Traore MD as PCP - [employer] PCP  Kaz Herman MD as Consulting Physician (Urology)    Date of Service: 2025     Diagnosis:   Specialty Problems          Radiation Oncology Problems    Prostate cancer (Multi)         Treatment Summary:  Other Treatments    Treatment Period Technique Fraction Dose Fractions Total Dose   Course 1 2025-2025  (days elapsed: 7)         prostate 2025-2025 SBRT 750 / 750 cGy  / 5 3,000 / 3,750 cGy     SUBJECTIVE: The patient feels well and has no issues.  He has mild urinary urgency.  His BP was elevated today and pulse was 80, but it is normally near 40-50. He is followed by caridiology.        OBJECTIVE:   Vital Signs:  BP (!) 209/116   Pulse 80   Temp 36.3 °C (97.3 °F) (Skin)   Resp 18   Wt 71 kg (156 lb 8.4 oz)   SpO2 99%   BMI 24.52 kg/m²     Other Pertinent Findings:     Toxicity Assessment          2025    08:39   Toxicity Assessment   Adverse Events Reviewed (WDL) No (Exceptions to WDL)   Treatment Site Prostate RT   Anorexia Grade 0   Anxiety Grade 0   Dehydration Grade 0   Depression Grade 0   Dermatitis Radiation Grade 0   Diarrhea Grade 0   Fatigue Grade 0   Fibrosis Deep Connective Tissue Grade 0   Fracture Grade 0   Nausea Grade 0   Pain Grade 0   Treatment Related Secondary Malignancy Grade 0   Tumor Pain Grade 0   Vomiting Grade 0   Proctitis Grade 0   Hematuria Grade 0   Urinary Incontinence Grade 0   Erectile Dysfunction Grade 0   Urinary Frequency Grade 1       mild frequency - immediatley post radiation treatment   Urinary Retention Grade 1       feels like not completely emptying   Urinary Tract Obstruction Grade 0   Urinary  Tract Pain Grade 0   Urinary Urgency Grade 1       mild urgency   Arthralgia Grade 0   Hot Flashes Grade 0        Assessment / Plan:  The patient is tolerating radiation therapy as anticipated.  Continue per current treatment plan.       For his  symptoms, I recommended he consider Flomax, 0.4 mg per night. The medication script was sent to the patient's pharmacy. I discussed possible side effects of the medication, including lightheadedness. The patient will therefore try to avoid taking the medication in the morning and will alert us if he notices symptoms.    End of treatment instructions provided. The patient will see us in 4 months for follow up.

## 2025-07-16 ENCOUNTER — HOSPITAL ENCOUNTER (OUTPATIENT)
Dept: RADIATION ONCOLOGY | Facility: CLINIC | Age: 75
Setting detail: RADIATION/ONCOLOGY SERIES
Discharge: HOME | End: 2025-07-16
Payer: MEDICARE

## 2025-07-16 DIAGNOSIS — Z51.0 ENCOUNTER FOR ANTINEOPLASTIC RADIATION THERAPY: ICD-10-CM

## 2025-07-16 DIAGNOSIS — C61 MALIGNANT NEOPLASM OF PROSTATE (MULTI): ICD-10-CM

## 2025-07-16 LAB
RAD ONC MSQ ACTUAL FRACTIONS DELIVERED: 5
RAD ONC MSQ ACTUAL SESSION DELIVERED DOSE: 750 CGRAY
RAD ONC MSQ ACTUAL TOTAL DOSE: 3750 CGRAY
RAD ONC MSQ ELAPSED DAYS: 9
RAD ONC MSQ LAST DATE: NORMAL
RAD ONC MSQ PRESCRIBED FRACTIONAL DOSE: 750 CGRAY
RAD ONC MSQ PRESCRIBED NUMBER OF FRACTIONS: 5
RAD ONC MSQ PRESCRIBED TECHNIQUE: NORMAL
RAD ONC MSQ PRESCRIBED TOTAL DOSE: 3750 CGRAY
RAD ONC MSQ PRESCRIPTION PATTERN COMMENT: NORMAL
RAD ONC MSQ START DATE: NORMAL
RAD ONC MSQ TREATMENT COURSE NUMBER: 1
RAD ONC MSQ TREATMENT SITE: NORMAL

## 2025-07-16 PROCEDURE — 77373 STRTCTC BDY RAD THER TX DLVR: CPT | Performed by: STUDENT IN AN ORGANIZED HEALTH CARE EDUCATION/TRAINING PROGRAM

## 2025-07-16 PROCEDURE — 77336 RADIATION PHYSICS CONSULT: CPT | Performed by: RADIOLOGY

## 2025-07-16 NOTE — PROGRESS NOTES
Education Documentation  Radiation Therapy, taught by Derrek Jara RN at 7/16/2025  8:27 AM.  Learner: Patient  Readiness: Acceptance  Method: Explanation  Response: Verbalizes Understanding    Treatment Plan and Schedule, taught by Derrek Jara RN at 7/16/2025  8:27 AM.  Learner: Patient  Readiness: Acceptance  Method: Explanation  Response: Verbalizes Understanding    Education Comments  07/16/25 0828 Derrek Jara RN  Patient completed treatment today. Patient given and reviewed What you need to know after radiation. We reviewed again side effect of radiation and how the effects can linger for 1-2 weeks. Patient instructed to call with questions or concerns. Per Dr. Espinoza, patient to follow up on 11/3/25 with Eduardo Longoria virtually with a PSA prior. Patient verbalizes understanding with verbal teach back. Derrek Jara MSN, RN, OCN

## 2025-07-16 NOTE — PROGRESS NOTES
Radiation Oncology Treatment Summary    Patient Name:  Giacomo Garay  MRN:  [id number]  :  1950    Referring Provider: Sebas Espinoza MD  Primary Care Provider: Evert Steward MD    Brief History: Giacomo Garay is a 74 y.o. male with No matching staging information was found for the patient..  The patient completed radiotherapy as outlined below.    Radiation Treatment Summary:    Other Treatments    Treatment Period Technique Fraction Dose Fractions Total Dose   Course 1 2025-2025  (days elapsed: 9)         prostate 2025-2025 SBRT 750 / 750 cGy 5 / 5 3,750 / 3,750 cGy       Please see the patient's [facility] chart for further details regarding the radiation plan, including beam energy.    Concurrent Chemotherapy:  Treatment Plans       No treatment plans exist          CTCAE Toxicity Overview:   Toxicity Assessment          2025    08:39   Toxicity Assessment   Adverse Events Reviewed (WDL) No (Exceptions to WDL)   Treatment Site Prostate RT   Anorexia Grade 0   Anxiety Grade 0   Dehydration Grade 0   Depression Grade 0   Dermatitis Radiation Grade 0   Diarrhea Grade 0   Fatigue Grade 0   Fibrosis Deep Connective Tissue Grade 0   Fracture Grade 0   Nausea Grade 0   Pain Grade 0   Treatment Related Secondary Malignancy Grade 0   Tumor Pain Grade 0   Vomiting Grade 0   Proctitis Grade 0   Hematuria Grade 0   Urinary Incontinence Grade 0   Erectile Dysfunction Grade 0   Urinary Frequency Grade 1       mild frequency - immediatley post radiation treatment   Urinary Retention Grade 1       feels like not completely emptying   Urinary Tract Obstruction Grade 0   Urinary Tract Pain Grade 0   Urinary Urgency Grade 1       mild urgency   Arthralgia Grade 0   Hot Flashes Grade 0     Patient Disposition: Per Molly Espinoza, patient to follow up on 11/3/25 with a PSA prior.             Arthralgia Grade 0   Hot Flashes Grade 0     Patient Disposition: Per Molly Espinoza, patient to follow up on 11/3/25 with a PSA prior.

## 2025-07-17 ENCOUNTER — HOSPITAL ENCOUNTER (OUTPATIENT)
Dept: CARDIOLOGY | Facility: CLINIC | Age: 75
Discharge: HOME | End: 2025-07-17
Payer: MEDICARE

## 2025-07-17 DIAGNOSIS — I10 ESSENTIAL HYPERTENSION: ICD-10-CM

## 2025-07-17 DIAGNOSIS — I44.1 SECOND DEGREE AV BLOCK, MOBITZ TYPE I: ICD-10-CM

## 2025-07-17 DIAGNOSIS — I44.2 COMPLETE HEART BLOCK: ICD-10-CM

## 2025-07-17 LAB
AORTIC VALVE MEAN GRADIENT: 8 MMHG
AORTIC VALVE PEAK VELOCITY: 2.08 M/S
AV PEAK GRADIENT: 17 MMHG
AVA (PEAK VEL): 2.08 CM2
AVA (VTI): 2.07 CM2
EJECTION FRACTION APICAL 4 CHAMBER: 47.1
EJECTION FRACTION: 60 %
LEFT ATRIUM VOLUME AREA LENGTH INDEX BSA: 60.4 ML/M2
LEFT VENTRICLE INTERNAL DIMENSION DIASTOLE: 4.92 CM (ref 3.5–6)
LEFT VENTRICULAR OUTFLOW TRACT DIAMETER: 1.92 CM
MITRAL VALVE E/A RATIO: 0.62
RIGHT VENTRICLE FREE WALL PEAK S': 17 CM/S
RIGHT VENTRICLE PEAK SYSTOLIC PRESSURE: 43 MMHG
TRICUSPID ANNULAR PLANE SYSTOLIC EXCURSION: 2.6 CM

## 2025-07-17 PROCEDURE — 93306 TTE W/DOPPLER COMPLETE: CPT | Performed by: INTERNAL MEDICINE

## 2025-07-17 PROCEDURE — 93306 TTE W/DOPPLER COMPLETE: CPT

## 2025-07-18 LAB
ALBUMIN SERPL-MCNC: 4.6 G/DL (ref 3.6–5.1)
ALP SERPL-CCNC: 46 U/L (ref 35–144)
ALT SERPL-CCNC: 22 U/L (ref 9–46)
ANION GAP SERPL CALCULATED.4IONS-SCNC: 10 MMOL/L (CALC) (ref 7–17)
AST SERPL-CCNC: 24 U/L (ref 10–35)
BASOPHILS # BLD AUTO: 43 CELLS/UL (ref 0–200)
BASOPHILS NFR BLD AUTO: 0.7 %
BILIRUB SERPL-MCNC: 0.8 MG/DL (ref 0.2–1.2)
BUN SERPL-MCNC: 19 MG/DL (ref 7–25)
CALCIUM SERPL-MCNC: 9.8 MG/DL (ref 8.6–10.3)
CHLORIDE SERPL-SCNC: 101 MMOL/L (ref 98–110)
CHOLEST SERPL-MCNC: 202 MG/DL
CHOLEST/HDLC SERPL: 3 (CALC)
CO2 SERPL-SCNC: 27 MMOL/L (ref 20–32)
CREAT SERPL-MCNC: 1.17 MG/DL (ref 0.7–1.28)
EGFRCR SERPLBLD CKD-EPI 2021: 66 ML/MIN/1.73M2
EOSINOPHIL # BLD AUTO: 140 CELLS/UL (ref 15–500)
EOSINOPHIL NFR BLD AUTO: 2.3 %
ERYTHROCYTE [DISTWIDTH] IN BLOOD BY AUTOMATED COUNT: 12.1 % (ref 11–15)
EST. AVERAGE GLUCOSE BLD GHB EST-MCNC: 131 MG/DL
EST. AVERAGE GLUCOSE BLD GHB EST-SCNC: 7.3 MMOL/L
GLUCOSE SERPL-MCNC: 116 MG/DL (ref 65–99)
HBA1C MFR BLD: 6.2 %
HCT VFR BLD AUTO: 50.7 % (ref 38.5–50)
HDLC SERPL-MCNC: 67 MG/DL
HGB BLD-MCNC: 16 G/DL (ref 13.2–17.1)
LDLC SERPL CALC-MCNC: 116 MG/DL (CALC)
LYMPHOCYTES # BLD AUTO: 1379 CELLS/UL (ref 850–3900)
LYMPHOCYTES NFR BLD AUTO: 22.6 %
MCH RBC QN AUTO: 27.9 PG (ref 27–33)
MCHC RBC AUTO-ENTMCNC: 31.6 G/DL (ref 32–36)
MCV RBC AUTO: 88.5 FL (ref 80–100)
MONOCYTES # BLD AUTO: 555 CELLS/UL (ref 200–950)
MONOCYTES NFR BLD AUTO: 9.1 %
NEUTROPHILS # BLD AUTO: 3983 CELLS/UL (ref 1500–7800)
NEUTROPHILS NFR BLD AUTO: 65.3 %
NONHDLC SERPL-MCNC: 135 MG/DL (CALC)
PLATELET # BLD AUTO: 219 THOUSAND/UL (ref 140–400)
PMV BLD REES-ECKER: 11.2 FL (ref 7.5–12.5)
POTASSIUM SERPL-SCNC: 5.1 MMOL/L (ref 3.5–5.3)
PROT SERPL-MCNC: 7.3 G/DL (ref 6.1–8.1)
RBC # BLD AUTO: 5.73 MILLION/UL (ref 4.2–5.8)
SODIUM SERPL-SCNC: 138 MMOL/L (ref 135–146)
TRIGL SERPL-MCNC: 91 MG/DL
WBC # BLD AUTO: 6.1 THOUSAND/UL (ref 3.8–10.8)

## 2025-07-21 ENCOUNTER — APPOINTMENT (OUTPATIENT)
Dept: RADIOLOGY | Facility: HOSPITAL | Age: 75
End: 2025-07-21
Payer: MEDICARE

## 2025-07-21 ENCOUNTER — HOSPITAL ENCOUNTER (OUTPATIENT)
Facility: HOSPITAL | Age: 75
Setting detail: OUTPATIENT SURGERY
Discharge: HOME | End: 2025-07-21
Attending: INTERNAL MEDICINE | Admitting: INTERNAL MEDICINE
Payer: MEDICARE

## 2025-07-21 VITALS
OXYGEN SATURATION: 100 % | BODY MASS INDEX: 24.48 KG/M2 | HEIGHT: 67 IN | SYSTOLIC BLOOD PRESSURE: 152 MMHG | RESPIRATION RATE: 15 BRPM | WEIGHT: 156 LBS | DIASTOLIC BLOOD PRESSURE: 90 MMHG | HEART RATE: 66 BPM | TEMPERATURE: 98.3 F

## 2025-07-21 DIAGNOSIS — Z95.0 PACEMAKER: ICD-10-CM

## 2025-07-21 DIAGNOSIS — I44.1 SECOND DEGREE AV BLOCK, MOBITZ TYPE I: Primary | ICD-10-CM

## 2025-07-21 DIAGNOSIS — I44.2 COMPLETE HEART BLOCK: Primary | ICD-10-CM

## 2025-07-21 PROCEDURE — 99152 MOD SED SAME PHYS/QHP 5/>YRS: CPT | Performed by: INTERNAL MEDICINE

## 2025-07-21 PROCEDURE — C1892 INTRO/SHEATH,FIXED,PEEL-AWAY: HCPCS | Performed by: INTERNAL MEDICINE

## 2025-07-21 PROCEDURE — C1785 PMKR, DUAL, RATE-RESP: HCPCS | Performed by: INTERNAL MEDICINE

## 2025-07-21 PROCEDURE — C1898 LEAD, PMKR, OTHER THAN TRANS: HCPCS | Performed by: INTERNAL MEDICINE

## 2025-07-21 PROCEDURE — 2750000001 HC OR 275 NO HCPCS: Performed by: INTERNAL MEDICINE

## 2025-07-21 PROCEDURE — 71045 X-RAY EXAM CHEST 1 VIEW: CPT | Performed by: RADIOLOGY

## 2025-07-21 PROCEDURE — 33208 INSRT HEART PM ATRIAL & VENT: CPT | Performed by: INTERNAL MEDICINE

## 2025-07-21 PROCEDURE — 2500000004 HC RX 250 GENERAL PHARMACY W/ HCPCS (ALT 636 FOR OP/ED): Performed by: NURSE PRACTITIONER

## 2025-07-21 PROCEDURE — 7100000009 HC PHASE TWO TIME - INITIAL BASE CHARGE: Performed by: INTERNAL MEDICINE

## 2025-07-21 PROCEDURE — C1781 MESH (IMPLANTABLE): HCPCS | Performed by: INTERNAL MEDICINE

## 2025-07-21 PROCEDURE — 2500000004 HC RX 250 GENERAL PHARMACY W/ HCPCS (ALT 636 FOR OP/ED): Performed by: INTERNAL MEDICINE

## 2025-07-21 PROCEDURE — 2780000003 HC OR 278 NO HCPCS: Performed by: INTERNAL MEDICINE

## 2025-07-21 PROCEDURE — C1887 CATHETER, GUIDING: HCPCS | Performed by: INTERNAL MEDICINE

## 2025-07-21 PROCEDURE — C1894 INTRO/SHEATH, NON-LASER: HCPCS | Performed by: INTERNAL MEDICINE

## 2025-07-21 PROCEDURE — 33208 INSRT HEART PM ATRIAL & VENT: CPT | Mod: KX | Performed by: INTERNAL MEDICINE

## 2025-07-21 PROCEDURE — 2720000007 HC OR 272 NO HCPCS: Performed by: INTERNAL MEDICINE

## 2025-07-21 PROCEDURE — 99223 1ST HOSP IP/OBS HIGH 75: CPT | Performed by: NURSE PRACTITIONER

## 2025-07-21 PROCEDURE — 71045 X-RAY EXAM CHEST 1 VIEW: CPT

## 2025-07-21 PROCEDURE — 99153 MOD SED SAME PHYS/QHP EA: CPT | Performed by: INTERNAL MEDICINE

## 2025-07-21 PROCEDURE — 7100000010 HC PHASE TWO TIME - EACH INCREMENTAL 1 MINUTE: Performed by: INTERNAL MEDICINE

## 2025-07-21 DEVICE — IPG W1DR01 AZURE XT DR MRI WL USA BCP
Type: IMPLANTABLE DEVICE | Site: HEART | Status: FUNCTIONAL
Brand: AZURE™ XT DR MRI SURESCAN™

## 2025-07-21 DEVICE — LEAD 383069 SELECT SECURE US EN FPA
Type: IMPLANTABLE DEVICE | Site: CHEST | Status: FUNCTIONAL
Brand: SELECTSECURE™

## 2025-07-21 DEVICE — LEAD 5076-52 CAPSUREFIX NOVUS US EN
Type: IMPLANTABLE DEVICE | Site: HEART | Status: FUNCTIONAL
Brand: CAPSUREFIX® NOVUS

## 2025-07-21 RX ORDER — ACETAMINOPHEN 325 MG/1
650 TABLET ORAL EVERY 4 HOURS PRN
Status: DISCONTINUED | OUTPATIENT
Start: 2025-07-21 | End: 2025-07-21 | Stop reason: HOSPADM

## 2025-07-21 RX ORDER — CEFAZOLIN SODIUM 1 G/50ML
1 SOLUTION INTRAVENOUS ONCE
Status: COMPLETED | OUTPATIENT
Start: 2025-07-21 | End: 2025-07-21

## 2025-07-21 RX ORDER — FENTANYL CITRATE 50 UG/ML
INJECTION, SOLUTION INTRAMUSCULAR; INTRAVENOUS AS NEEDED
Status: DISCONTINUED | OUTPATIENT
Start: 2025-07-21 | End: 2025-07-21 | Stop reason: HOSPADM

## 2025-07-21 RX ORDER — ONDANSETRON HYDROCHLORIDE 2 MG/ML
4 INJECTION, SOLUTION INTRAVENOUS EVERY 8 HOURS PRN
Status: DISCONTINUED | OUTPATIENT
Start: 2025-07-21 | End: 2025-07-21 | Stop reason: HOSPADM

## 2025-07-21 RX ORDER — ACETAMINOPHEN 650 MG/1
650 SUPPOSITORY RECTAL EVERY 4 HOURS PRN
Status: DISCONTINUED | OUTPATIENT
Start: 2025-07-21 | End: 2025-07-21 | Stop reason: HOSPADM

## 2025-07-21 RX ORDER — MIDAZOLAM HYDROCHLORIDE 1 MG/ML
INJECTION, SOLUTION INTRAMUSCULAR; INTRAVENOUS AS NEEDED
Status: DISCONTINUED | OUTPATIENT
Start: 2025-07-21 | End: 2025-07-21 | Stop reason: HOSPADM

## 2025-07-21 RX ORDER — ONDANSETRON 4 MG/1
4 TABLET, FILM COATED ORAL EVERY 8 HOURS PRN
Status: DISCONTINUED | OUTPATIENT
Start: 2025-07-21 | End: 2025-07-21 | Stop reason: HOSPADM

## 2025-07-21 RX ORDER — BUPIVACAINE HYDROCHLORIDE 2.5 MG/ML
INJECTION, SOLUTION EPIDURAL; INFILTRATION; INTRACAUDAL; PERINEURAL AS NEEDED
Status: DISCONTINUED | OUTPATIENT
Start: 2025-07-21 | End: 2025-07-21 | Stop reason: HOSPADM

## 2025-07-21 RX ORDER — ACETAMINOPHEN 160 MG/5ML
650 SOLUTION ORAL EVERY 4 HOURS PRN
Status: DISCONTINUED | OUTPATIENT
Start: 2025-07-21 | End: 2025-07-21 | Stop reason: HOSPADM

## 2025-07-21 RX ADMIN — CEFAZOLIN SODIUM 1 G: 1 INJECTION, SOLUTION INTRAVENOUS at 09:13

## 2025-07-21 ASSESSMENT — COLUMBIA-SUICIDE SEVERITY RATING SCALE - C-SSRS
1. IN THE PAST MONTH, HAVE YOU WISHED YOU WERE DEAD OR WISHED YOU COULD GO TO SLEEP AND NOT WAKE UP?: NO
2. HAVE YOU ACTUALLY HAD ANY THOUGHTS OF KILLING YOURSELF?: NO
6. HAVE YOU EVER DONE ANYTHING, STARTED TO DO ANYTHING, OR PREPARED TO DO ANYTHING TO END YOUR LIFE?: NO

## 2025-07-21 ASSESSMENT — ENCOUNTER SYMPTOMS
NEUROLOGICAL NEGATIVE: 1
ENDOCRINE NEGATIVE: 1
CONSTITUTIONAL NEGATIVE: 1
EYES NEGATIVE: 1
HEMATOLOGIC/LYMPHATIC NEGATIVE: 1
GASTROINTESTINAL NEGATIVE: 1
CARDIOVASCULAR NEGATIVE: 1
RESPIRATORY NEGATIVE: 1
PSYCHIATRIC NEGATIVE: 1
ALLERGIC/IMMUNOLOGIC NEGATIVE: 1
MUSCULOSKELETAL NEGATIVE: 1

## 2025-07-21 ASSESSMENT — PAIN SCALES - GENERAL: PAINLEVEL_OUTOF10: 0 - NO PAIN

## 2025-07-21 ASSESSMENT — PAIN - FUNCTIONAL ASSESSMENT: PAIN_FUNCTIONAL_ASSESSMENT: 0-10

## 2025-07-21 NOTE — POST-PROCEDURE NOTE
Physician Transition of Care Summary  Invasive Cardiovascular Lab    Procedure Date: 7/21/2025  Attending:    * Dl Max - Primary  Resident/Fellow/Other Assistant: Surgeons and Role:  * No surgeons found with a matching role *    Indications:   Pre-op Diagnosis      * Complete heart block [I44.2]    Post-procedure diagnosis:   Post-op Diagnosis     * Complete heart block [I44.2]    Procedure(s):   PPM IMPLANT DUAL  19069 - GA INS NEW/RPLCMT PRM PM W/TRANSV ELTRD ATRIAL&VENT        Procedure Findings:   See below    Description of the Procedure:   After written witnessed informed consent was obtained the procedure from the patient, the patient was transferred to the EP laboratory. The patient was in the fasting state. A grounding pad was placed. The patient was set up for monitoring of surface ECG. The left upper chest was prepped and draped in the usual sterile fashion. Bupivacaine was administered locally for anesthetic. A 4 cm incision was placed at the left deltopectoral groove.  A prepectoral pocket was made to accommodate the device generator. Left Axillary vein access x 2    The right ventricular lead was delivered to the RV via a peel away 7Fr long sheath to the RV mid septal (bundle region ) under fluoroscopic guidance. Position was confirmed in the ALLYSON and YAÑEZ projections. The helix was deployed, and two way communication was set up with the device interrogator. The sensing was 20 mv, with threshold for ventricular capture at 0.5V at  0.5 ms pulse width, impedance was  874 ohms. The lead was sutured at the suture sleeves the pectoralis minor muscle. There was no phrenic nerve stimulation maximal output.    The right atrial lead was delivered to the RA via a peel- away 7Fr sheath to the RAA region under fluoroscopic guidance. Position was confirmed in the ALLYSON and YAÑEZ projections. The helix was deployed, and two way communication was set up with the device interrogator. The sensing was 1.7 mv, with  threshold for atrial capture at 1.1V at  0.5 ms pulse width, impedance was 1165 ohms. The lead was sutured at the suture sleeves the pectoralis minor muscle. There was no phrenic nerve stimulation maximal output.    Both leads with an attached to the generator header and placed in a preformed generator pocket.   A Tyrex antibiotic pouch was utilized  The incision was closed with 3 layers of suture. A 0 Vicryl interrupted layer, followed by a 3.0 B. V-loc continuous layer, and a 4.0 V-loc continuous layer for the subcuticular layer. Steri Strips and a dressing were applied.    Summary  Successful dual chamber pacemaker implant (physiologic bundle pacing).     Complications:   none    Stents/Implants:   Implants          Pacemaker          Lead, Select Secure, Mdl 3830 69 Cm - [id number] - [id number] - Implanted        Inventory item: LEAD, SELECT SECURE, MDL 3830 69 CM Model/Cat number: 3830-69    Serial number: [id number] : [employer]    Lot number: [id number] Device identifier: [id number]    Implant Date: 7/21/2025        GUDID Information       Request status Successful        Brand name: SELECTSECURE™ Version/Model: [id number]    Company name: [employer] MRI safety info as of 7/21/25: Labeling does not contain MRI Safety Information    Contains dry or latex rubber: No      GMDN P.T. name: Endocardial/interventricular septal pacing lead                As of 7/21/2025       Status: Implanted                        Lead, Capsurefix Novus, 52 Cm - [id number] - Implanted        Inventory item: LEAD, CAPSUREFIX NOVUS, 52 CM Model/Cat number: 5076-52    Serial number: [id number] : [employer]    Lot number: [id number] Device identifier: [id number]    Implant Date: 7/21/2025        GUDID Information       Request status Successful        Brand name: Capsurefix® Novus Version/Model: 5076-52    Company name: [employer] MRI safety info as of 7/21/25: Labeling does not contain MRI  Safety Information    Contains dry or latex rubber: No      GMDN P.T. name: Endocardial/interventricular septal pacing lead                As of 7/21/2025       Status: Implanted                        Pacemaker, Dual Chamber, [place] Mri Xt Dr - [id number] - Implanted        Inventory item: PACEMAKER, DUAL CHAMBER, ARTEMIO MRI XT DR Model/Cat number: W1DR01    Serial number: [id number] : [employer]    Lot number: [id number] Device identifier: [id number]    Implant Date: 7/21/2025        GUDID Information       Request status Successful        Brand name: Artemio™ XT DR MRI SureScan™ Version/Model: W1DR01    Company name: [employer] MRI safety info as of 7/21/25: MR Conditional    Contains dry or latex rubber: No      GMDN P.T. name: Dual-chamber implantable pacemaker, rate-responsive                As of 7/21/2025       Status: Implanted                              Anticoagulation/Antiplatelet Plan:   none    Estimated Blood Loss:   * No values recorded between 7/21/2025  9:00 AM and 7/21/2025 10:47 AM *    Anesthesia: Moderate Sedation Anesthesia Staff: No anesthesia staff entered.    Any Specimen(s) Removed:   No specimens collected during this procedure.    Disposition:   stable      Electronically signed by: Dl Max MD, 7/21/2025 12:01 PM

## 2025-07-21 NOTE — NURSING NOTE
END OF PROCEDURE MONITORING CRITERIA  01. BP is within +/- 20% of preprocedure  02. Oxygen sat is at 92% or above on RA, or existing order for O2 treatment, or at pre-sedation levels, otherwise new O2 order needed.  03. Unless the patient has a pre-procedure history of diminished level of consciousness, s/he is easily arousable and when aroused is able to responds appropriately for his/her age.  04. Significant complications related to the specific procedure are absent, have been controlled, or have been evaluated, including:      A. Pain.      B. Wound drainage.      C. All drains and tubes are patent.      D. Nausea and Vomiting. Vomiting is not persisten and has not occurred within 15 minutes prior to discharge.      E. Bladder distention. Voided bladder and/or no symptoms or urinary retention (e.g., bladder distention, frequent voiding in small amounts).      F. Neurovascular status.      G. Level of Consciousness consistent with pre procedural status.        spouse  affirmed that they were driving the patient home.  Patient received [employer] information and temporary card.

## 2025-07-22 ENCOUNTER — TELEPHONE (OUTPATIENT)
Facility: CLINIC | Age: 75
End: 2025-07-22
Payer: MEDICARE

## 2025-07-22 ASSESSMENT — PAIN SCALES - GENERAL: PAINLEVEL_OUTOF10: 0 - NO PAIN

## 2025-07-23 ENCOUNTER — ANCILLARY PROCEDURE (OUTPATIENT)
Facility: CLINIC | Age: 75
End: 2025-07-23
Payer: MEDICARE

## 2025-07-23 DIAGNOSIS — I44.1 SECOND DEGREE AV BLOCK, MOBITZ TYPE I: ICD-10-CM

## 2025-07-23 DIAGNOSIS — Z95.0 PACEMAKER: ICD-10-CM

## 2025-07-29 DIAGNOSIS — R39.9 LOWER URINARY TRACT SYMPTOMS (LUTS): ICD-10-CM

## 2025-07-29 RX ORDER — TAMSULOSIN HYDROCHLORIDE 0.4 MG/1
0.4 CAPSULE ORAL DAILY
Qty: 90 CAPSULE | Refills: 2 | Status: SHIPPED | OUTPATIENT
Start: 2025-07-29 | End: 2026-04-25

## 2025-08-05 LAB — BODY SURFACE AREA: 1.86 M2

## 2025-08-05 PROCEDURE — 93248 EXT ECG>7D<15D REV&INTERPJ: CPT | Performed by: INTERNAL MEDICINE

## 2025-09-02 ENCOUNTER — ANCILLARY PROCEDURE (OUTPATIENT)
Facility: CLINIC | Age: 75
End: 2025-09-02
Payer: MEDICARE

## 2025-09-02 DIAGNOSIS — Z95.0 PACEMAKER: ICD-10-CM

## 2025-09-02 DIAGNOSIS — I44.1 SECOND DEGREE AV BLOCK, MOBITZ TYPE I: ICD-10-CM

## 2025-09-02 PROCEDURE — 93280 PM DEVICE PROGR EVAL DUAL: CPT

## 2025-09-03 ENCOUNTER — TELEMEDICINE (OUTPATIENT)
Dept: PRIMARY CARE | Facility: CLINIC | Age: 75
End: 2025-09-03
Payer: MEDICARE

## 2025-09-03 DIAGNOSIS — Z23 NEED FOR IMMUNIZATION AGAINST TYPHOID: Primary | ICD-10-CM

## 2025-09-03 PROCEDURE — 99213 OFFICE O/P EST LOW 20 MIN: CPT | Performed by: FAMILY MEDICINE

## 2025-09-03 RX ORDER — SALMONELLA TYPHI TY2 VI POLYSACCHARIDE ANTIGEN 25 UG/.5ML
0.5 INJECTION, SOLUTION INTRAMUSCULAR ONCE
Qty: 0.5 ML | Refills: 0 | Status: SHIPPED | OUTPATIENT
Start: 2025-09-03 | End: 2025-09-03

## 2025-09-04 ENCOUNTER — APPOINTMENT (OUTPATIENT)
Dept: PRIMARY CARE | Facility: CLINIC | Age: 75
End: 2025-09-04
Payer: MEDICARE

## 2026-05-27 ENCOUNTER — APPOINTMENT (OUTPATIENT)
Dept: PRIMARY CARE | Facility: CLINIC | Age: 76
End: 2026-05-27
Payer: MEDICARE

## (undated) DEVICE — INTRODUCER SYSTEM, PRELUDE SNAP, SPLITTABLE, HEMOSTATIC, 7FR

## (undated) DEVICE — Device

## (undated) DEVICE — GUIDEWIRE, J TIP, 3 MM, 0.035 IN X 180 CM, PTFE

## (undated) DEVICE — SUTURE, STRATAFIX, 3-0, SPIRAL MONOCRYL PLUS, UNDYED 20CM  SH

## (undated) DEVICE — SLITTER, ADJUSTABLE

## (undated) DEVICE — MICROINTRODUCER KIT, VSI, 4FR X 40CM, STIFFEN

## (undated) DEVICE — SYSTEM, SPACEOAR VUE, HYDROGEL

## (undated) DEVICE — KIT, NERVE ROOT BLOCK, SPINAL, 22 G X 6 IN

## (undated) DEVICE — COVER, EQUIPMENT, ZERO GRAVITY

## (undated) DEVICE — SYRINGE, 10 CC, LUER LOCK

## (undated) DEVICE — DRESSING, ISLAND, TELFA, 4 X 5 IN

## (undated) DEVICE — CABLE, SURGICAL, SM CLIP

## (undated) DEVICE — SUTURE, STRATAFIX, 4-0, MONOCRYL PLUS, PS-2 30CM, UNDYED

## (undated) DEVICE — CONTAINER, SPECIMEN, 120 ML, STERILE

## (undated) DEVICE — PROBE COVER, ULTRASOUND 8818

## (undated) DEVICE — GLOVE, SURGICAL, PROTEXIS PI , 7.5, PF, LF

## (undated) DEVICE — TOWEL, SURGICAL, NEURO, O/R, 16 X 26, BLUE, STERILE

## (undated) DEVICE — ENVELOPE, ANTIBACTERIAL, AIGIS RX TYRX, ABSORBABLE, LRG

## (undated) DEVICE — GOWN, ASTOUND, XL

## (undated) DEVICE — LABELS, OR GENERAL, W/MARKER

## (undated) DEVICE — PAD, ELECTRODE DEFIB PADPRO ADULT STRL W/ADAPTER

## (undated) DEVICE — SYRINGE, 60 CC, IRRIGATION, TOOMEY TIP

## (undated) DEVICE — COVER, TABLE, 44X90

## (undated) DEVICE — APPLICATOR, CHLORAPREP, W/ORANGE TINT, 26ML

## (undated) DEVICE — CATHETER, ACUMEN C315, FIXED, HIS SHAPE